# Patient Record
Sex: FEMALE | Race: WHITE | NOT HISPANIC OR LATINO | URBAN - NONMETROPOLITAN AREA
[De-identification: names, ages, dates, MRNs, and addresses within clinical notes are randomized per-mention and may not be internally consistent; named-entity substitution may affect disease eponyms.]

---

## 2017-04-27 ENCOUNTER — OFFICE VISIT (OUTPATIENT)
Dept: GASTROENTEROLOGY | Facility: CLINIC | Age: 47
End: 2017-04-27

## 2017-04-27 VITALS
TEMPERATURE: 98.3 F | SYSTOLIC BLOOD PRESSURE: 112 MMHG | HEIGHT: 70 IN | HEART RATE: 70 BPM | WEIGHT: 186.8 LBS | DIASTOLIC BLOOD PRESSURE: 84 MMHG | BODY MASS INDEX: 26.74 KG/M2 | OXYGEN SATURATION: 99 %

## 2017-04-27 DIAGNOSIS — K50.119 CROHN'S DISEASE OF LARGE INTESTINE WITH COMPLICATION (HCC): Primary | ICD-10-CM

## 2017-04-27 DIAGNOSIS — K50.10 CROHN'S DISEASE OF LARGE INTESTINE WITHOUT COMPLICATION (HCC): Primary | ICD-10-CM

## 2017-04-27 PROCEDURE — 99214 OFFICE O/P EST MOD 30 MIN: CPT | Performed by: INTERNAL MEDICINE

## 2017-04-27 RX ORDER — MESALAMINE 1.2 G/1
1200 TABLET, DELAYED RELEASE ORAL 4 TIMES DAILY
Qty: 360 TABLET | Refills: 3 | Status: SHIPPED | OUTPATIENT
Start: 2017-04-27 | End: 2017-04-27 | Stop reason: SDUPTHER

## 2017-04-27 RX ORDER — MESALAMINE 1.2 G/1
1200 TABLET, DELAYED RELEASE ORAL 4 TIMES DAILY
Qty: 360 TABLET | Refills: 3 | Status: SHIPPED | OUTPATIENT
Start: 2017-04-27 | End: 2018-02-23 | Stop reason: SDUPTHER

## 2017-04-27 RX ORDER — AZATHIOPRINE 50 MG/1
TABLET ORAL
COMMUNITY
Start: 2017-02-12 | End: 2017-05-25

## 2017-04-27 RX ORDER — TRIAMTERENE AND HYDROCHLOROTHIAZIDE 37.5; 25 MG/1; MG/1
CAPSULE ORAL
Refills: 5 | COMMUNITY
Start: 2017-04-09

## 2017-04-27 RX ORDER — MESALAMINE 1.2 G/1
TABLET, DELAYED RELEASE ORAL
COMMUNITY
Start: 2017-02-12 | End: 2017-04-27 | Stop reason: SDUPTHER

## 2017-04-27 NOTE — PROGRESS NOTES
Chief Complaint   Patient presents with   • Colonoscopy     Patient currently not having any problems.  No family history of colon cancer.     Subjective   HPI    Dulce Maria Bernal is a 46 y.o. female who presents with a history of Crohn's Disease.   Status of disease is quiet and stable.  The severity is described as Mild.  She denies  abdominal cramping, diarrhea, bloody stool and anorexia.  Experiences 1-2 BM daily.  Her only complaint is increased gas.  Has started to eat more yogurt.  Previous diagnostic test include  colonoscopy.  She is currently being treated with Imuran and Lialda.  Lab work from Dr Simms office in Oct 2016 was normal.  She reports she has gone 3 weeks without Imuran and has not experienced Crohn's related symptoms.    Past Medical History:   Diagnosis Date   • Crohn's disease    • Ulcerative colitis    • Ulcerative proctitis      Outpatient Prescriptions Marked as Taking for the 4/27/17 encounter (Office Visit) with Garry Faria, DO   Medication Sig Dispense Refill   • azaTHIOprine (IMURAN) 50 MG tablet      • LIALDA 1.2 G EC tablet      • triamterene-hydrochlorothiazide (DYAZIDE) 37.5-25 MG per capsule TAKE ONE CAPSULE BY MOUTH EVERY DAY  5     No Known Allergies  Social History     Social History   • Marital status:      Spouse name: N/A   • Number of children: N/A   • Years of education: N/A     Occupational History   • Not on file.     Social History Main Topics   • Smoking status: Never Smoker   • Smokeless tobacco: Never Used   • Alcohol use Yes      Comment: LITTLE BIT DAILY   • Drug use: No   • Sexual activity: Not on file     Other Topics Concern   • Not on file     Social History Narrative     Family History   Problem Relation Age of Onset   • Breast cancer Maternal Grandmother    • Crohn's disease Sister    • Colon cancer Neg Hx    • Colon polyps Neg Hx    • Esophageal cancer Neg Hx    • Liver cancer Neg Hx    • Liver disease Neg Hx    • Rectal cancer Neg Hx    •  Stomach cancer Neg Hx      Review of Systems   Constitutional: Negative for fatigue, fever and unexpected weight change.   HENT: Negative for hearing loss, sore throat and voice change.    Eyes: Negative for visual disturbance.   Respiratory: Negative for cough, shortness of breath and wheezing.    Cardiovascular: Negative for chest pain and palpitations.   Gastrointestinal: Negative for abdominal pain, blood in stool and vomiting.   Endocrine: Negative for polydipsia and polyuria.   Genitourinary: Negative for difficulty urinating, dysuria, hematuria and urgency.   Musculoskeletal: Negative for joint swelling and myalgias.   Skin: Negative for color change, rash and wound.   Neurological: Negative for dizziness, tremors, seizures and syncope.   Hematological: Does not bruise/bleed easily.   Psychiatric/Behavioral: Negative for agitation and confusion. The patient is not nervous/anxious.      Objective   Vitals:    04/27/17 1316   BP: 112/84   Pulse: 70   Temp: 98.3 °F (36.8 °C)   SpO2: 99%     Physical Exam   Constitutional: She is oriented to person, place, and time. She appears well-developed and well-nourished.   HENT:   Head: Normocephalic and atraumatic.   Eyes: Conjunctivae are normal. Pupils are equal, round, and reactive to light. No scleral icterus.   Neck: No JVD present. No thyroid mass and no thyromegaly present.   Cardiovascular: Normal rate, regular rhythm and normal heart sounds.  Exam reveals no gallop and no friction rub.    No murmur heard.  Pulmonary/Chest: Effort normal and breath sounds normal. No accessory muscle usage. No respiratory distress. She has no wheezes. She has no rales.   Abdominal: Soft. Bowel sounds are normal. She exhibits no distension, no ascites and no mass. There is no splenomegaly or hepatomegaly. There is no tenderness. There is no rebound and no guarding.   Genitourinary:   Genitourinary Comments: Rectal-Did not examine   Musculoskeletal: Normal range of motion. She  exhibits no edema.   Neurological: She is alert and oriented to person, place, and time.   Deemed a reliable historian, able to converse without difficulty and able to move all extremities without difficulty   Skin: Skin is warm and dry.   Psychiatric: She has a normal mood and affect. Her behavior is normal.     Imaging Results (most recent)     None        Assessment/Plan   Dulce Maria was seen today for colonoscopy.    Diagnoses and all orders for this visit:    Crohn's disease of large intestine with complication  -     Case Request; Standing  -     Case Request    Other orders  -     Sod Picosulfate-Mag Ox-Cit Acd (PREPOPIK) 10-3.5-12 MG-GM-GM pack; Take as directed  -     Implement Anesthesia Orders Day of Procedure; Standing  -     Obtain Informed Consent; Standing  -     Verify Informed Consent; Standing      COLONOSCOPY WITH ANESTHESIA (N/A)     Avoid gas producing foods  Information regarding Fodmap diet given to patient  Lengthy discussion regarding continuing Imuran v starting biologic therapy  For now will decrease Imuran to 50 mg to decrease risk of side effects  I will discuss possible treatment plan with Dr Rios

## 2017-04-27 NOTE — PATIENT INSTRUCTIONS
This Information Is About Your Diet    Low FODMAP Diet  FODMAPs are a type of carbohydrate / sugar found in foods. FODMAPs draw water into the intestinal tract, are poorly absorbed in the small intestine and are fermented by bacteria in the large intestine which produce gas. Distention in the large intestine from gas or water can trigger symptoms. Symptoms may include: Abdominal discomfort, gas, distention, cramps, bloating, fullness, diarrhea, constipation, and/or nausea. FODMAPs do not cause irritable bowel syndrome but managing FODMAPs in your diet may help with reducing your symptoms.    FODMAP is an acronym for:  Fermentable Oligosaccharides Disaccharides Monosaccharides And Polyols    • Fructans (wheat, rye, barley, garlic, vegetables)  • Galactins (beans, lentils)  • Fructose (fruits, fruit juice, honey, high fructose corn syrup)  • Lactose (dairy products)  • Polyols or “Sugar Alcohols” (Sorbitol, Mannitol, Xylitol, Malitol, Lactitol, Erthrytol, Isomalt, fruits)    General Guidelines  • Fresh or frozen fruit may be better tolerated than canned fruit.  • Limit concentrated sources of fruit - such as dried fruits or fruit juices.  • Avoid eating large amounts of any fruit.  • Cooked vegetables may be tolerated better.  • Tolerance may depend on the amount you eat at one time.  • Sweeteners such as sorbitol, mannitol, xylitol, malitol and isomalt are used in sugar-free gum, candies and mints.  • Many liquid medications and some personal care items may contain lactose or sugar alcohols (sorbitol, mannitol, xylitol, malitol, isomalt). Examples include: liquid pain relievers (including liquid gelcaps), cough medicines and cough drops.    This diet is recommended to be followed for 4-6 weeks and then begin to add small amounts of 1 to 2 foods back into diet at a time to figure out which foods can be eaten or need to be avoided completely.  If too many foods are added back to the diet at once, it may be difficult to  tell which foods cause problems.  Many people feel better the first week they stop eating these foods and will only end up needing to avoid large amounts of a few of the high FODMAP foods.    Examples of test foods from each category  • Lactose: ½ - 1 cup of milk  • Fructose: ½ yaniv or 102 tsp of honey  • Fructans: 2 slices of whole wheat bread, 1 garlic clove or 1 cup of pasta  • Galactans: ½ cup of lentils or chickpeas  Sugar Alcohols: 2-4 dried apricots or ½ cup of mushrooms          Foods to INCLUDE in a Low FODMAP diet  • Lactose - Lactose free dairy products, Milk (rice or almond), custard, Hard cheese (cheddar, swiss, brie, blue cheese, feta, mozzarella, parmesan), cream cheese, half & half and sherbert.  • Fruits (Fructose) - Limit to ½ cup serving; bananas, blueberries, oranges, strawberries, cantaloupe, kiwi, honeydew, grapefruit, lemon, lime, grapes, pineapple and rhubarb.  • Vegetables (Fructans) - Limit to 1-3 (1/2 cup) servings per meal; lettuce, spinach, carrots, yam, cucumber, green beans, eggplant, bok gibran, red bell pepper, tomatoes, potatoes, zucchini, winter or butternut squash, sweet potatoes, pumpkin, celery and corn.  • Grains (Fructans) - Gluten-free (breads, pasta, cereal and crackers) rice, corn, and oats.  • Meats, Poultry, Fish and Eggs - beef, pork, chicken, turkey, fish and eggs.  • Beans (Glactins) - Tofu, peanuts, peanut butter, almonds, pecans, walnuts, pumpkin seeds, sesame seeds, and sunflower seeds.  • Beverages - Decaffeinated tea  • Condiments/Sweeteners (Polyols) - Sugar, Equal, Splenda, most spices and herbs, mayonnaise, mustard, vinegar, olives, butter, margarine and oil.  • Alcohol - Limit alcohol to 1 serving per day because it is a gastric stimulant: beer, wine, vodka, and gin.    Foods to AVOID in a Low FODMAP diet  • Lactose - Milk (cow, sheep, goat and soy), evaporated milk, condensed milk, buttermilk, cottage cheese, ricotts cheese, sour cream, whipping cream, ice  cream, frozen yogurt and chocolate.  • Fruits (Fructose) - Concentrated fruit juices, dried fruit, apple, avocado, pears, cherries, raspberries, blackberries, watermelon, plums, apricots, peaches, nectarines, prunes, yaniv, papaya, coconut, canned fruit, large portions of fruit.  • Vegetables (Fructans) - Asparagus, artichokes, cabbage, sugar snap peas, onions, leeks, cauliflower, mushrooms, green bell pepper, broccoli, Brussel sprouts, garlic, okra, beets and summer squash.  • Grains (Fructans) - wheat, rye, barley, cereals with sorbitol and fructose.  • Meats, Poultry, Fish and Eggs - Soy products.  • Beans (Galactins) - Chickpeas, hummus, lentils, beans, black-eyed peas, soy products, pistachios and baked beans.  • Beverages - Fruit and vegetable juices, port wine, rosa, carbonated beverages, caffeinated coffee or tea.  • Condiments/Sweeteners (Polyols) - Honey, jam, jelly, molasses, high fructose corn syrup, nutmeg, pickles, relish, flavorings with sorbitol and fructose, sugar alcohols (sorbitol, mannitol, xylitol, malitol, isomalt, erythritol, and lactitol), hydrogenated starch hydrolysates.  • Alcohol - Rum

## 2017-05-25 ENCOUNTER — ANESTHESIA (OUTPATIENT)
Dept: GASTROENTEROLOGY | Facility: HOSPITAL | Age: 47
End: 2017-05-25

## 2017-05-25 ENCOUNTER — ANESTHESIA EVENT (OUTPATIENT)
Dept: GASTROENTEROLOGY | Facility: HOSPITAL | Age: 47
End: 2017-05-25

## 2017-05-25 ENCOUNTER — HOSPITAL ENCOUNTER (OUTPATIENT)
Facility: HOSPITAL | Age: 47
Setting detail: HOSPITAL OUTPATIENT SURGERY
Discharge: HOME | End: 2017-05-25
Attending: INTERNAL MEDICINE | Admitting: INTERNAL MEDICINE

## 2017-05-25 VITALS
TEMPERATURE: 97.7 F | DIASTOLIC BLOOD PRESSURE: 78 MMHG | BODY MASS INDEX: 26.34 KG/M2 | SYSTOLIC BLOOD PRESSURE: 128 MMHG | HEIGHT: 70 IN | OXYGEN SATURATION: 100 % | RESPIRATION RATE: 20 BRPM | WEIGHT: 184 LBS | HEART RATE: 51 BPM

## 2017-05-25 DIAGNOSIS — K51.20 ULCERATIVE PROCTITIS (HCC): ICD-10-CM

## 2017-05-25 LAB — B-HCG UR QL: NEGATIVE

## 2017-05-25 PROCEDURE — 45380 COLONOSCOPY AND BIOPSY: CPT | Performed by: INTERNAL MEDICINE

## 2017-05-25 PROCEDURE — 81025 URINE PREGNANCY TEST: CPT | Performed by: ANESTHESIOLOGY

## 2017-05-25 PROCEDURE — 88305 TISSUE EXAM BY PATHOLOGIST: CPT | Performed by: INTERNAL MEDICINE

## 2017-05-25 PROCEDURE — 25010000002 PROPOFOL 10 MG/ML EMULSION: Performed by: NURSE ANESTHETIST, CERTIFIED REGISTERED

## 2017-05-25 RX ORDER — AZATHIOPRINE 50 MG/1
50 TABLET ORAL 2 TIMES DAILY
Qty: 180 TABLET | Refills: 3 | Status: SHIPPED | OUTPATIENT
Start: 2017-05-25 | End: 2018-02-23 | Stop reason: SDUPTHER

## 2017-05-25 RX ORDER — LIDOCAINE HYDROCHLORIDE 10 MG/ML
0.5 INJECTION, SOLUTION INFILTRATION; PERINEURAL ONCE AS NEEDED
Status: DISCONTINUED | OUTPATIENT
Start: 2017-05-25 | End: 2017-05-25 | Stop reason: HOSPADM

## 2017-05-25 RX ORDER — SODIUM CHLORIDE 0.9 % (FLUSH) 0.9 %
1-10 SYRINGE (ML) INJECTION AS NEEDED
Status: DISCONTINUED | OUTPATIENT
Start: 2017-05-25 | End: 2017-05-25 | Stop reason: HOSPADM

## 2017-05-25 RX ORDER — SODIUM CHLORIDE 9 MG/ML
100 INJECTION, SOLUTION INTRAVENOUS CONTINUOUS
Status: CANCELLED | OUTPATIENT
Start: 2017-05-25

## 2017-05-25 RX ORDER — PROPOFOL 10 MG/ML
VIAL (ML) INTRAVENOUS AS NEEDED
Status: DISCONTINUED | OUTPATIENT
Start: 2017-05-25 | End: 2017-05-25 | Stop reason: SURG

## 2017-05-25 RX ORDER — SODIUM CHLORIDE 0.9 % (FLUSH) 0.9 %
1-10 SYRINGE (ML) INJECTION AS NEEDED
Status: CANCELLED | OUTPATIENT
Start: 2017-05-25

## 2017-05-25 RX ORDER — SODIUM CHLORIDE 9 MG/ML
100 INJECTION, SOLUTION INTRAVENOUS CONTINUOUS
Status: DISCONTINUED | OUTPATIENT
Start: 2017-05-25 | End: 2017-05-25 | Stop reason: HOSPADM

## 2017-05-25 RX ORDER — SODIUM CHLORIDE 9 MG/ML
500 INJECTION, SOLUTION INTRAVENOUS CONTINUOUS PRN
Status: DISCONTINUED | OUTPATIENT
Start: 2017-05-25 | End: 2017-05-25 | Stop reason: HOSPADM

## 2017-05-25 RX ORDER — SODIUM CHLORIDE 0.9 % (FLUSH) 0.9 %
3 SYRINGE (ML) INJECTION AS NEEDED
Status: DISCONTINUED | OUTPATIENT
Start: 2017-05-25 | End: 2017-05-25 | Stop reason: HOSPADM

## 2017-05-25 RX ADMIN — PROPOFOL 100 MG: 10 INJECTION, EMULSION INTRAVENOUS at 09:47

## 2017-05-25 RX ADMIN — SODIUM CHLORIDE 100 ML/HR: 9 INJECTION, SOLUTION INTRAVENOUS at 09:08

## 2017-05-26 ENCOUNTER — TELEPHONE (OUTPATIENT)
Dept: GASTROENTEROLOGY | Facility: CLINIC | Age: 47
End: 2017-05-26

## 2017-05-26 LAB
CYTO UR: NORMAL
LAB AP CASE REPORT: NORMAL
LAB AP CLINICAL INFORMATION: NORMAL
Lab: NORMAL
PATH REPORT.FINAL DX SPEC: NORMAL
PATH REPORT.GROSS SPEC: NORMAL

## 2018-01-30 ENCOUNTER — TRANSCRIBE ORDERS (OUTPATIENT)
Dept: ADMINISTRATIVE | Facility: HOSPITAL | Age: 48
End: 2018-01-30

## 2018-01-30 DIAGNOSIS — Z12.31 ENCOUNTER FOR SCREENING MAMMOGRAM FOR MALIGNANT NEOPLASM OF BREAST: Primary | ICD-10-CM

## 2018-02-02 ENCOUNTER — HOSPITAL ENCOUNTER (OUTPATIENT)
Dept: MAMMOGRAPHY | Facility: HOSPITAL | Age: 48
Discharge: HOME | End: 2018-02-02
Attending: OBSTETRICS & GYNECOLOGY | Admitting: OBSTETRICS & GYNECOLOGY

## 2018-02-02 DIAGNOSIS — Z12.31 ENCOUNTER FOR SCREENING MAMMOGRAM FOR MALIGNANT NEOPLASM OF BREAST: ICD-10-CM

## 2018-02-02 PROCEDURE — 77067 SCR MAMMO BI INCL CAD: CPT

## 2018-02-02 PROCEDURE — 77063 BREAST TOMOSYNTHESIS BI: CPT

## 2018-02-22 ENCOUNTER — TELEPHONE (OUTPATIENT)
Dept: GASTROENTEROLOGY | Facility: CLINIC | Age: 48
End: 2018-02-22

## 2018-02-22 NOTE — TELEPHONE ENCOUNTER
Patient called and stated her insurance has changed.she needs her imuran and lialda sent into [facility]

## 2018-02-23 DIAGNOSIS — K50.10 CROHN'S DISEASE OF LARGE INTESTINE WITHOUT COMPLICATION (HCC): ICD-10-CM

## 2018-02-23 RX ORDER — AZATHIOPRINE 50 MG/1
50 TABLET ORAL 2 TIMES DAILY
Qty: 180 TABLET | Refills: 2 | Status: SHIPPED | OUTPATIENT
Start: 2018-02-23 | End: 2018-05-24

## 2018-02-23 RX ORDER — MESALAMINE 1.2 G/1
1200 TABLET, DELAYED RELEASE ORAL 4 TIMES DAILY
Qty: 360 TABLET | Refills: 2 | Status: SHIPPED | OUTPATIENT
Start: 2018-02-23 | End: 2018-11-05

## 2018-10-15 RX ORDER — AZATHIOPRINE 50 MG/1
TABLET ORAL
Qty: 180 TABLET | Refills: 2 | OUTPATIENT
Start: 2018-10-15

## 2018-11-05 ENCOUNTER — OFFICE VISIT (OUTPATIENT)
Dept: GASTROENTEROLOGY | Facility: CLINIC | Age: 48
End: 2018-11-05

## 2018-11-05 VITALS
DIASTOLIC BLOOD PRESSURE: 100 MMHG | BODY MASS INDEX: 27.57 KG/M2 | WEIGHT: 192.6 LBS | HEIGHT: 70 IN | SYSTOLIC BLOOD PRESSURE: 140 MMHG | HEART RATE: 66 BPM | TEMPERATURE: 97.8 F | OXYGEN SATURATION: 100 %

## 2018-11-05 DIAGNOSIS — K50.90 CROHN'S DISEASE WITHOUT COMPLICATION, UNSPECIFIED GASTROINTESTINAL TRACT LOCATION (HCC): Primary | ICD-10-CM

## 2018-11-05 PROCEDURE — 99214 OFFICE O/P EST MOD 30 MIN: CPT | Performed by: INTERNAL MEDICINE

## 2018-11-05 RX ORDER — AZATHIOPRINE 100 MG/1
100 TABLET ORAL DAILY
COMMUNITY
End: 2018-11-05 | Stop reason: SDUPTHER

## 2018-11-05 RX ORDER — MESALAMINE 1.2 G/1
1200 TABLET, DELAYED RELEASE ORAL DAILY
Qty: 30 TABLET | Refills: 11 | Status: SHIPPED | OUTPATIENT
Start: 2018-11-05 | End: 2018-12-05

## 2018-11-05 RX ORDER — NORETHINDRONE ACETATE AND ETHINYL ESTRADIOL, AND FERROUS FUMARATE 1MG-20(24)
1 KIT ORAL DAILY
COMMUNITY
Start: 2018-10-29

## 2018-11-05 RX ORDER — AZATHIOPRINE 100 MG/1
100 TABLET ORAL DAILY
Qty: 30 TABLET | Refills: 11 | Status: SHIPPED | OUTPATIENT
Start: 2018-11-05 | End: 2019-01-14 | Stop reason: SDUPTHER

## 2018-11-05 NOTE — PROGRESS NOTES
Chief Complaint   Patient presents with   • Crohn's Disease     Medication refill.     Subjective   HPI    Dulce Maria Bernal is a 48 y.o. female who presents with a history of Crohn's Disease.   Status of disease is quiet and stable.  The severity is described as Mild.  She denies  abdominal cramping, diarrhea, bloody stool and anorexia.  Previous diagnostic test include  colonoscopy.  Takes Lialda and Imuran with control of Crohn's related sx.  Dr Calvert follows lab work anticipated apt Nov 30.  She has prevously followed by Dr Foreman for IBD, he started Imuran/Lialda.    Past Medical History:   Diagnosis Date   • Crohn's disease (CMS/HCC)    • Ulcerative colitis (CMS/HCC)    • Ulcerative proctitis (CMS/HCC)      Outpatient Prescriptions Marked as Taking for the 11/5/18 encounter (Office Visit) with Garry Faria, DO   Medication Sig Dispense Refill   • azaTHIOprine (IMURAN) 100 MG tablet Take 1 tablet by mouth Daily. 30 tablet 11   • TAYTULLA 1-20 MG-MCG(24) capsule 1 tablet Daily.     • triamterene-hydrochlorothiazide (DYAZIDE) 37.5-25 MG per capsule TAKE ONE CAPSULE BY MOUTH EVERY DAY  5   • [DISCONTINUED] azaTHIOprine (IMURAN) 100 MG tablet Take 100 mg by mouth Daily.     • [DISCONTINUED] LIALDA 1.2 g EC tablet Take 1 tablet by mouth 4 (Four) Times a Day. 360 tablet 2     No Known Allergies  Social History     Social History   • Marital status:      Spouse name: N/A   • Number of children: N/A   • Years of education: N/A     Occupational History   • Not on file.     Social History Main Topics   • Smoking status: Never Smoker   • Smokeless tobacco: Never Used   • Alcohol use Yes      Comment: LITTLE BIT DAILY   • Drug use: No   • Sexual activity: Defer     Other Topics Concern   • Not on file     Social History Narrative   • No narrative on file     Family History   Problem Relation Age of Onset   • Breast cancer Maternal Grandmother    • Crohn's disease Sister    • Colon cancer Neg Hx    • Colon  polyps Neg Hx    • Esophageal cancer Neg Hx    • Liver cancer Neg Hx    • Liver disease Neg Hx    • Rectal cancer Neg Hx    • Stomach cancer Neg Hx      Review of Systems   Constitutional: Negative for fatigue, fever and unexpected weight change.   HENT: Negative for hearing loss, sore throat and voice change.    Eyes: Negative for visual disturbance.   Respiratory: Negative for cough, shortness of breath and wheezing.    Cardiovascular: Negative for chest pain and palpitations.   Gastrointestinal: Negative for abdominal pain, blood in stool and vomiting.   Endocrine: Negative for polydipsia and polyuria.   Genitourinary: Negative for difficulty urinating, dysuria, hematuria and urgency.   Musculoskeletal: Negative for joint swelling and myalgias.   Skin: Negative for color change, rash and wound.   Neurological: Negative for dizziness, tremors, seizures and syncope.   Hematological: Does not bruise/bleed easily.   Psychiatric/Behavioral: Negative for agitation and confusion. The patient is not nervous/anxious.      Objective   Vitals:    11/05/18 1455   BP: 140/100   Pulse: 66   Temp: 97.8 °F (36.6 °C)   SpO2: 100%     Physical Exam   Constitutional: She is oriented to person, place, and time. She appears well-developed and well-nourished. She is cooperative.   HENT:   Head: Normocephalic and atraumatic.   Eyes: Pupils are equal, round, and reactive to light. Conjunctivae are normal. No scleral icterus.   Neck: Normal range of motion. Neck supple. No JVD present. No thyroid mass and no thyromegaly present.   Cardiovascular: Normal rate, regular rhythm and normal heart sounds.  Exam reveals no gallop and no friction rub.    No murmur heard.  Pulmonary/Chest: Effort normal and breath sounds normal. No accessory muscle usage. No respiratory distress. She has no wheezes. She has no rales.   Abdominal: Soft. Normal appearance and bowel sounds are normal. She exhibits no distension, no ascites and no mass. There is no  hepatosplenomegaly. There is no tenderness. There is no rebound and no guarding.   Musculoskeletal: Normal range of motion. She exhibits no edema or tenderness.     Vascular Status -  Her right foot exhibits normal foot vasculature  and no edema. Her left foot exhibits normal foot vasculature  and no edema.  Lymphadenopathy:     She has no cervical adenopathy.   Neurological: She is alert and oriented to person, place, and time. She has normal strength. Gait normal.   Skin: Skin is warm, dry and intact. No rash noted.     Imaging Results (most recent)     None        Body mass index is 27.64 kg/m².  Assessment/Plan   Dulce Maria was seen today for crohn's disease.    Diagnoses and all orders for this visit:    Crohn's disease without complication, unspecified gastrointestinal tract location (CMS/HCC)  -     mesalamine (LIALDA) 1.2 g EC tablet; Take 1 tablet by mouth Daily for 30 days.  -     azaTHIOprine (IMURAN) 100 MG tablet; Take 1 tablet by mouth Daily.      * Surgery not found *    Request Dr Calvert draw CBC  Discussion regarding prolong use of Imuran and transitioning to biologic therapy  Decrease lialda from 4 to 1 pills x 6 months then stop   Pt not having problems wishes to proceed with Imuran  Suggested eval by IBD  cscope due 2020    Patient's Body mass index is 27.64 kg/m². BMI is above normal parameters. Recommendations include: no follow-up required.

## 2019-01-07 ENCOUNTER — TELEPHONE (OUTPATIENT)
Dept: GASTROENTEROLOGY | Facility: CLINIC | Age: 49
End: 2019-01-07

## 2019-01-07 NOTE — TELEPHONE ENCOUNTER
Treated with Imuran for Crohn's Disease    Labs from PCP 1/4/19    Hgb 14.0  Plt 292  WBC 5.8    CMP unremarkable   BUN 17  Cr 0.9  ALT 13  AST 16

## 2019-01-11 ENCOUNTER — TELEPHONE (OUTPATIENT)
Dept: GASTROENTEROLOGY | Facility: CLINIC | Age: 49
End: 2019-01-11

## 2019-01-11 NOTE — TELEPHONE ENCOUNTER
Patient called and needs her imuran and mesalamine (lialda)sent into [facility] 3 month refills.

## 2019-01-14 DIAGNOSIS — K50.90 CROHN'S DISEASE WITHOUT COMPLICATION, UNSPECIFIED GASTROINTESTINAL TRACT LOCATION (HCC): ICD-10-CM

## 2019-01-14 RX ORDER — AZATHIOPRINE 100 MG/1
100 TABLET ORAL DAILY
Qty: 90 TABLET | Refills: 3 | Status: SHIPPED | OUTPATIENT
Start: 2019-01-14 | End: 2019-01-14 | Stop reason: SDUPTHER

## 2019-01-14 RX ORDER — MESALAMINE 1.2 G/1
1200 TABLET, DELAYED RELEASE ORAL 4 TIMES DAILY
Qty: 360 TABLET | Refills: 0 | Status: SHIPPED | OUTPATIENT
Start: 2019-01-14 | End: 2019-02-13

## 2019-01-14 RX ORDER — AZATHIOPRINE 100 MG/1
100 TABLET ORAL DAILY
Qty: 90 TABLET | Refills: 3 | Status: SHIPPED | OUTPATIENT
Start: 2019-01-14 | End: 2019-01-15

## 2019-01-14 RX ORDER — MESALAMINE 1.2 G/1
1200 TABLET, DELAYED RELEASE ORAL 4 TIMES DAILY
Qty: 360 TABLET | Refills: 0 | Status: SHIPPED | OUTPATIENT
Start: 2019-01-14 | End: 2019-01-14 | Stop reason: SDUPTHER

## 2019-01-14 NOTE — TELEPHONE ENCOUNTER
Imuran/lialda sent to [facility]  Message left to [facility] prescriptions    Imuran/lialda resent to [facility]

## 2019-01-14 NOTE — TELEPHONE ENCOUNTER
Imuran and lialda sent to pharmacy    No refills sent in on Lialda as she should be weaning off per Nov office note

## 2019-01-15 RX ORDER — AZATHIOPRINE 50 MG/1
100 TABLET ORAL DAILY
Qty: 180 TABLET | Refills: 3 | Status: SHIPPED | OUTPATIENT
Start: 2019-01-15 | End: 2019-02-14

## 2019-03-05 ENCOUNTER — TRANSCRIBE ORDERS (OUTPATIENT)
Dept: LABOR AND DELIVERY | Facility: HOSPITAL | Age: 49
End: 2019-03-05

## 2019-03-05 DIAGNOSIS — Z12.39 SCREENING BREAST EXAMINATION: Primary | ICD-10-CM

## 2019-03-08 ENCOUNTER — HOSPITAL ENCOUNTER (OUTPATIENT)
Dept: MAMMOGRAPHY | Facility: HOSPITAL | Age: 49
Discharge: HOME | End: 2019-03-08
Admitting: OBSTETRICS & GYNECOLOGY

## 2019-03-08 PROCEDURE — 77067 SCR MAMMO BI INCL CAD: CPT

## 2019-03-08 PROCEDURE — 77063 BREAST TOMOSYNTHESIS BI: CPT

## 2019-09-25 ENCOUNTER — OFFICE VISIT (OUTPATIENT)
Dept: GASTROENTEROLOGY | Facility: CLINIC | Age: 49
End: 2019-09-25

## 2019-09-25 VITALS
BODY MASS INDEX: 22.9 KG/M2 | WEIGHT: 160 LBS | HEART RATE: 54 BPM | SYSTOLIC BLOOD PRESSURE: 134 MMHG | DIASTOLIC BLOOD PRESSURE: 80 MMHG | OXYGEN SATURATION: 99 % | HEIGHT: 70 IN | TEMPERATURE: 97 F

## 2019-09-25 DIAGNOSIS — K50.919 CROHN'S DISEASE WITH COMPLICATION, UNSPECIFIED GASTROINTESTINAL TRACT LOCATION (HCC): Primary | ICD-10-CM

## 2019-09-25 PROCEDURE — 99213 OFFICE O/P EST LOW 20 MIN: CPT | Performed by: INTERNAL MEDICINE

## 2019-09-25 RX ORDER — MESALAMINE 1.2 G/1
1200 TABLET, DELAYED RELEASE ORAL DAILY
Qty: 90 TABLET | Refills: 3 | Status: SHIPPED | OUTPATIENT
Start: 2019-09-25 | End: 2019-10-25

## 2019-09-25 RX ORDER — AZATHIOPRINE 50 MG/1
100 TABLET ORAL DAILY
Qty: 180 TABLET | Refills: 3 | Status: SHIPPED | OUTPATIENT
Start: 2019-09-25 | End: 2019-10-25

## 2019-09-25 RX ORDER — MESALAMINE 1.2 G/1
4800 TABLET, DELAYED RELEASE ORAL
COMMUNITY
End: 2019-09-25 | Stop reason: DRUGHIGH

## 2019-09-25 RX ORDER — AZATHIOPRINE 50 MG/1
TABLET ORAL
COMMUNITY
Start: 2019-06-26 | End: 2019-09-25

## 2019-09-25 NOTE — PROGRESS NOTES
Chief Complaint   Patient presents with   • Crohn's Disease     neds meds refill lialda and imuran [facility] 3 month refills       PCP: Junior Calvert MD  REFER: No ref. provider found      Subjective   HPI    Dulce Maria Bernal is a 49 y.o. female who presents with a history of Crohn's Disease.   Status of disease is quiet and stable.  The severity is described as Mild.  She denies  abdominal cramping, diarrhea, bloody stool and anorexia.  Previous diagnostic test include  colonoscopy.    Past Medical History:   Diagnosis Date   • Crohn's disease (CMS/[facility])    • Ulcerative colitis (CMS/[facility])    • Ulcerative proctitis (CMS/[facility])      Outpatient Medications Marked as Taking for the 9/25/19 encounter (Office Visit) with Garry Faria, DO   Medication Sig Dispense Refill   • TAYTULLA 1-20 MG-MCG(24) capsule 1 tablet Daily.     • triamterene-hydrochlorothiazide (DYAZIDE) 37.5-25 MG per capsule TAKE ONE CAPSULE BY MOUTH EVERY DAY  5   • [DISCONTINUED] azaTHIOprine (IMURAN) 50 MG tablet      • [DISCONTINUED] mesalamine (LIALDA) 1.2 g EC tablet Take 4,800 mg by mouth.       No Known Allergies  Social History     Socioeconomic History   • Marital status:      Spouse name: Not on file   • Number of children: Not on file   • Years of education: Not on file   • Highest education level: Not on file   Tobacco Use   • Smoking status: Never Smoker   • Smokeless tobacco: Never Used   Substance and Sexual Activity   • Alcohol use: Yes     Comment: LITTLE BIT DAILY   • Drug use: No   • Sexual activity: Defer     Family History   Problem Relation Age of Onset   • Breast cancer Maternal Grandmother    • Crohn's disease Sister    • No Known Problems Mother    • No Known Problems Father    • No Known Problems Brother    • No Known Problems Daughter    • No Known Problems Son    • No Known Problems Paternal Grandmother    • No Known Problems Maternal Aunt    • No Known Problems Paternal Aunt    • Colon cancer Neg Hx    • Colon  polyps Neg Hx    • Esophageal cancer Neg Hx    • Liver cancer Neg Hx    • Liver disease Neg Hx    • Rectal cancer Neg Hx    • Stomach cancer Neg Hx    • BRCA 1/2 Neg Hx    • Endometrial cancer Neg Hx    • Ovarian cancer Neg Hx      Review of Systems   Constitutional: Negative for fatigue, fever and unexpected weight change.   HENT: Negative for hearing loss, sore throat and voice change.    Eyes: Negative for visual disturbance.   Respiratory: Negative for cough, shortness of breath and wheezing.    Cardiovascular: Negative for chest pain and palpitations.   Gastrointestinal: Negative for abdominal pain, blood in stool and vomiting.   Endocrine: Negative for polydipsia and polyuria.   Genitourinary: Negative for difficulty urinating, dysuria, hematuria and urgency.   Musculoskeletal: Negative for joint swelling and myalgias.   Skin: Negative for color change, rash and wound.   Neurological: Negative for dizziness, tremors, seizures and syncope.   Hematological: Does not bruise/bleed easily.   Psychiatric/Behavioral: Negative for agitation and confusion. The patient is not nervous/anxious.      Objective   Vitals:    09/25/19 1510   BP: 134/80   Pulse: 54   Temp: 97 °F (36.1 °C)   SpO2: 99%     Physical Exam   Constitutional: She is oriented to person, place, and time. She appears well-developed and well-nourished. She is cooperative.   HENT:   Head: Normocephalic and atraumatic.   Eyes: Conjunctivae are normal. Pupils are equal, round, and reactive to light. No scleral icterus.   Neck: Normal range of motion. Neck supple. No JVD present. No thyroid mass and no thyromegaly present.   Cardiovascular: Normal rate, regular rhythm and normal heart sounds. Exam reveals no gallop and no friction rub.   No murmur heard.  Pulmonary/Chest: Effort normal and breath sounds normal. No accessory muscle usage. No respiratory distress. She has no wheezes. She has no rales.   Abdominal: Soft. Normal appearance and bowel sounds are  normal. She exhibits no distension, no ascites and no mass. There is no hepatosplenomegaly. There is no tenderness. There is no rebound and no guarding.   Musculoskeletal: Normal range of motion. She exhibits no edema or tenderness.     Vascular Status -  Her right foot exhibits normal foot vasculature  and no edema. Her left foot exhibits normal foot vasculature  and no edema.  Lymphadenopathy:     She has no cervical adenopathy.   Neurological: She is alert and oriented to person, place, and time. She has normal strength. Gait normal.   Skin: Skin is warm, dry and intact. No rash noted.     Imaging Results (most recent)     None        Body mass index is 22.96 kg/m².  Assessment/Plan   Dulce Maria was seen today for crohn's disease.    Diagnoses and all orders for this visit:    Crohn's disease with complication, unspecified gastrointestinal tract location (CMS/HCC)    Other orders  -     azaTHIOprine (IMURAN) 50 MG tablet; Take 2 tablets by mouth Daily for 30 days.  -     mesalamine (LIALDA) 1.2 g EC tablet; Take 1 tablet by mouth Daily for 30 days.      * Surgery not found *    Colonoscopy due May 2020  Encouraged consideration for biologic therapy and stopping imuran therapy  Discussed risk of cancer with use of Imuran  Discussed referral to [facility]  She will consider medication adjustments

## 2019-12-30 RX ORDER — AZATHIOPRINE 50 MG/1
TABLET ORAL
Qty: 180 TABLET | Refills: 3 | OUTPATIENT
Start: 2019-12-30

## 2020-01-02 ENCOUNTER — TELEPHONE (OUTPATIENT)
Dept: GASTROENTEROLOGY | Facility: CLINIC | Age: 50
End: 2020-01-02

## 2020-01-02 NOTE — TELEPHONE ENCOUNTER
Called into [facility] [phone]. Azathioprine 50mg 2 daily #180 0 REFILLS.

## 2020-01-24 ENCOUNTER — TELEPHONE (OUTPATIENT)
Dept: GASTROENTEROLOGY | Facility: CLINIC | Age: 50
End: 2020-01-24

## 2020-02-03 ENCOUNTER — OFFICE VISIT (OUTPATIENT)
Dept: OBGYN | Age: 50
End: 2020-02-03
Payer: COMMERCIAL

## 2020-02-03 VITALS
HEART RATE: 68 BPM | HEIGHT: 70 IN | BODY MASS INDEX: 23.48 KG/M2 | SYSTOLIC BLOOD PRESSURE: 122 MMHG | DIASTOLIC BLOOD PRESSURE: 89 MMHG | WEIGHT: 164 LBS

## 2020-02-03 PROCEDURE — 99386 PREV VISIT NEW AGE 40-64: CPT | Performed by: NURSE PRACTITIONER

## 2020-02-03 RX ORDER — LORAZEPAM 1 MG/1
1 TABLET ORAL EVERY 6 HOURS PRN
COMMUNITY

## 2020-02-03 RX ORDER — TRIAMTERENE AND HYDROCHLOROTHIAZIDE 37.5; 25 MG/1; MG/1
1 CAPSULE ORAL EVERY MORNING
COMMUNITY

## 2020-02-03 RX ORDER — NORETHINDRONE ACETATE AND ETHINYL ESTRADIOL, AND FERROUS FUMARATE 1MG-20(24)
1 KIT ORAL DAILY
Qty: 84 CAPSULE | Refills: 3 | Status: SHIPPED | OUTPATIENT
Start: 2020-02-03 | End: 2021-01-11

## 2020-02-03 ASSESSMENT — ENCOUNTER SYMPTOMS
EYES NEGATIVE: 1
CONSTIPATION: 0
RESPIRATORY NEGATIVE: 1
ALLERGIC/IMMUNOLOGIC NEGATIVE: 1
GASTROINTESTINAL NEGATIVE: 1
DIARRHEA: 0

## 2020-02-03 NOTE — PROGRESS NOTES
Pt presents today to establish care and for pap smear and breast exam. She had been taking Tatula using for hormones and would like this back [employer] would not pay for it.      EODRH:[id number]  Pap smear:2019  Contraception: wants to discuss     P:2  Ab:2  Bone density:NA  Colonoscopy:2017

## 2020-02-03 NOTE — PROGRESS NOTES
tablet Take 10 mg by mouth 3 times daily as needed for Muscle spasms      mesalamine (LIALDA) 1.2 G EC tablet Take 4,800 mg by mouth daily      azaTHIOprine 100 MG TABS Take 100 mg by mouth daily       No current facility-administered medications for this visit. No Known Allergies  Vitals:    02/03/20 1351   BP: 122/89   Pulse: 68     Body mass index is 23.53 kg/m². Review of Systems   Constitutional: Negative. HENT: Negative. Eyes: Negative. Respiratory: Negative. Cardiovascular: Negative. Gastrointestinal: Negative. Negative for constipation and diarrhea. Endocrine: Negative. Genitourinary: Positive for menstrual problem (irregular). Negative for frequency and urgency. Musculoskeletal: Negative. Skin: Negative. Allergic/Immunologic: Negative. Neurological: Negative. Hematological: Negative. Psychiatric/Behavioral: Negative. All other systems reviewed and are negative. Physical Exam  Vitals signs and nursing note reviewed. Constitutional:       Appearance: She is well-developed. HENT:      Head: Normocephalic. Neck:      Musculoskeletal: Normal range of motion and neck supple. Thyroid: No thyroid mass or thyromegaly. Cardiovascular:      Rate and Rhythm: Normal rate and regular rhythm. Pulmonary:      Effort: Pulmonary effort is normal.      Breath sounds: Normal breath sounds. Chest:      Breasts:         Right: No inverted nipple, mass, nipple discharge or skin change. Left: No inverted nipple, mass, nipple discharge or skin change. Comments: Bilateral implants intact  Abdominal:      Palpations: Abdomen is soft. There is no mass. Tenderness: There is no abdominal tenderness. Genitourinary:     General: Normal vulva. Vagina: Normal.      Cervix: Cervical bleeding (menstrual blood) present. No cervical motion tenderness. Uterus: Normal. Not enlarged. Adnexa:         Right: No mass or tenderness. Left: No mass or tenderness. Comments: Pap collected  Menstrual blood noted on pap brush  Musculoskeletal: Normal range of motion. Skin:     General: Skin is warm and dry. Neurological:      Mental Status: She is alert and oriented to person, place, and time. Psychiatric:         Attention and Perception: Attention normal.         Mood and Affect: Mood normal.         Speech: Speech normal.         Behavior: Behavior normal.         Thought Content: Thought content normal.         Cognition and Memory: Cognition normal.         Judgment: Judgment normal.          Diagnosis Orders   1. Women's annual routine gynecological examination     2. Screening for cervical cancer  PAP SMEAR    Human papillomavirus (HPV) DNA probe thin prep high risk   3. Screening for breast cancer  JUAN DAVID DIGITAL SCREEN W CAD BILATERAL       MEDICATIONS:  Orders Placed This Encounter   Medications    Norethin Ace-Eth Estrad-FE (TAYTULLA) 1-20 MG-MCG(24) CAPS     Sig: Take 1 capsule by mouth daily     Dispense:  84 capsule     Refill:  3       ORDERS:  Orders Placed This Encounter   Procedures    JUAN DAVID DIGITAL SCREEN W CAD BILATERAL    PAP SMEAR    Human papillomavirus (HPV) DNA probe thin prep high risk       PLAN:  Pap collected  Gave order for screening mammogram  Gave coupon card and refills of Taytulla, if ins does not accept, then can start [address]    Patient Instructions   Patient Education        Breast Self-Exam: Care Instructions  Your Care Instructions    A breast self-exam is when you check your breasts for lumps or changes. This regular exam helps you learn how your breasts normally look and feel. Most breast problems or changes are not because of cancer. Breast self-exam is not a substitute for a mammogram. Having regular breast exams by your doctor and regular mammograms improve your chances of finding any problems with your breasts.   Some women set a time each month to do a step-by-step breast self-exam. Other women like a less formal system. They might look at their breasts as they brush their teeth, or feel their breasts once in a while in the shower. If you notice a change in your breast, tell your doctor. Follow-up care is a key part of your treatment and safety. Be sure to make and go to all appointments, and call your doctor if you are having problems. It's also a good idea to know your test results and keep a list of the medicines you take. How do you do a breast self-exam?  · The best time to examine your breasts is usually one week after your menstrual period begins. Your breasts should not be tender then. If you do not have periods, you might do your exam on a day of the month that is easy to remember. · To examine your breasts:  ? Remove all your clothes above the waist and lie down. When you are lying down, your breast tissue spreads evenly over your chest wall, which makes it easier to feel all your breast tissue. ? Use the pads--not the fingertips--of the 3 middle fingers of your left hand to check your right breast. Move your fingers slowly in small coin-sized circles that overlap. ? Use three levels of pressure to feel of all your breast tissue. Use light pressure to feel the tissue close to the skin surface. Use medium pressure to feel a little deeper. Use firm pressure to feel your tissue close to your breastbone and ribs. Use each pressure level to feel your breast tissue before moving on to the next spot. ? Check your entire breast, moving up and down as if following a strip from the collarbone to the bra line, and from the armpit to the ribs. Repeat until you have covered the entire breast.  ? Repeat this procedure for your left breast, using the pads of the 3 middle fingers of your right hand. · To examine your breasts while in the shower:  ? Place one arm over your head and lightly soap your breast on that side. ?  Using the pads of your fingers, gently move your hand over your breast (in the smoke or allow others to smoke around you. If you need help quitting, talk to your doctor about stop-smoking programs and medicines. These can increase your chances of quitting for good. · Talk to your doctor about whether you have any risk factors for sexually transmitted infections (STIs). Having one sex partner (who does not have STIs and does not have sex with anyone else) is a good way to avoid these infections. · Use birth control if you do not want to have children at this time. Talk with your doctor about the choices available and what might be best for you. · Protect your skin from too much sun. When you're outdoors from 10 a.m. to 4 p.m., stay in the shade or cover up with clothing and a hat with a wide brim. Wear sunglasses that block UV rays. Even when it's cloudy, put broad-spectrum sunscreen (SPF 30 or higher) on any exposed skin. · See a dentist one or two times a year for checkups and to have your teeth cleaned. · Wear a seat belt in the car. Follow your doctor's advice about when to have certain tests. These tests can spot problems early. For everyone  · Cholesterol. Have the fat (cholesterol) in your blood tested after age 21. Your doctor will tell you how often to have this done based on your age, family history, or other things that can increase your risk for heart disease. · Blood pressure. Have your blood pressure checked during a routine doctor visit. Your doctor will tell you how often to check your blood pressure based on your age, your blood pressure results, and other factors. · Vision. Talk with your doctor about how often to have a glaucoma test.  · Diabetes. Ask your doctor whether you should have tests for diabetes. · Colon cancer. Your risk for colorectal cancer gets higher as you get older. Some experts say that adults should start regular screening at age 48 and stop at age 76. Others say to start before age 48 or continue after age 76.  Talk with your doctor about your risk and when to start and stop screening. For women  · Breast exam and mammogram. Talk to your doctor about when you should have a clinical breast exam and a mammogram. Medical experts differ on whether and how often women under 50 should have these tests. Your doctor can help you decide what is right for you. · Cervical cancer screening test and pelvic exam. Begin with a Pap test at age 24. The test often is part of a pelvic exam. Starting at age 27, you may choose to have a Pap test, an HPV test, or both tests at the same time (called co-testing). Talk with your doctor about how often to have testing. · Tests for sexually transmitted infections (STIs). Ask whether you should have tests for STIs. You may be at risk if you have sex with more than one person, especially if your partners do not wear condoms. For men  · Tests for sexually transmitted infections (STIs). Ask whether you should have tests for STIs. You may be at risk if you have sex with more than one person, especially if you do not wear a condom. · Testicular cancer exam. Ask your doctor whether you should check your testicles regularly. · Prostate exam. Talk to your doctor about whether you should have a blood test (called a PSA test) for prostate cancer. Experts differ on whether and when men should have this test. Some experts suggest it if you are older than 39 and are -American or have a father or brother who got prostate cancer when he was younger than 72. When should you call for help? Watch closely for changes in your health, and be sure to contact your doctor if you have any problems or symptoms that concern you. Where can you learn more? Go to [email or web address]://vinod.[email or web address] [email or web address] org and sign in to your SaveMeeting account. Enter P072 in the [employer] box to learn more about \"Well Visit, Ages 25 to 48: Care Instructions. \"     If you do not have an account, please click on the \"Sign Up Now\" link.   Current as of: August 21, 2019  Content Version: 12.3  © 0638-5931 Healthwise, Incorporated. Care instructions adapted under license by [facility] ([facility]). If you have questions about a medical condition or this instruction, always ask your healthcare professional. [address] any warranty or liability for your use of this information.

## 2020-02-03 NOTE — PATIENT INSTRUCTIONS
have a father or brother who got prostate cancer when he was younger than 72. When should you call for help? Watch closely for changes in your health, and be sure to contact your doctor if you have any problems or symptoms that concern you. Where can you learn more? Go to https://chpechuyitaeb.[email or web address] org and sign in to your [employer] account. Enter P072 in the [employer] box to learn more about \"Well Visit, Ages 25 to 48: Care Instructions. \"     If you do not have an account, please click on the \"Sign Up Now\" link. Current as of: August 21, 2019  Content Version: 12.3  © 6814-3105 Healthwise, Incorporated. Care instructions adapted under license by [facility] ([facility]). If you have questions about a medical condition or this instruction, always ask your healthcare professional. [address] any warranty or liability for your use of this information.

## 2020-02-04 ENCOUNTER — TRANSCRIBE ORDERS (OUTPATIENT)
Dept: ADMINISTRATIVE | Facility: HOSPITAL | Age: 50
End: 2020-02-04

## 2020-02-04 DIAGNOSIS — Z12.31 OTHER SCREENING MAMMOGRAM: Primary | ICD-10-CM

## 2020-02-06 LAB
HPV COMMENT: NORMAL
HPV TYPE 16: NOT DETECTED
HPV TYPE 18: NOT DETECTED
HPVOH (OTHER TYPES): NOT DETECTED

## 2020-03-10 ENCOUNTER — TELEPHONE (OUTPATIENT)
Dept: GASTROENTEROLOGY | Facility: CLINIC | Age: 50
End: 2020-03-10

## 2020-03-10 NOTE — TELEPHONE ENCOUNTER
LAST OFFICE VISIT : 09/25/2019  COLON RECALL MAY 2020    Patient states no NEW GI, HEART, or LUNG problems at this time.     We verified insurance, meds, and past medical history.    Patient verbally understood instructions and stated they would have a  with them the day of procedure.    Pharmacy : [facility]    I will mail a copy of instructions and instructed patient to contact me if they have any questions.     Please put in case request.     Thanks.     06/05/2020 AT 7AM

## 2020-03-12 ENCOUNTER — PREP FOR SURGERY (OUTPATIENT)
Dept: OTHER | Facility: HOSPITAL | Age: 50
End: 2020-03-12

## 2020-03-12 DIAGNOSIS — K50.90 CROHN'S DISEASE WITHOUT COMPLICATION, UNSPECIFIED GASTROINTESTINAL TRACT LOCATION (HCC): Primary | ICD-10-CM

## 2020-03-12 RX ORDER — SODIUM PICOSULFATE, MAGNESIUM OXIDE, AND ANHYDROUS CITRIC ACID 10; 3.5; 12 MG/160ML; G/160ML; G/160ML
1 LIQUID ORAL TAKE AS DIRECTED
Qty: 320 ML | Refills: 0 | Status: SHIPPED | OUTPATIENT
Start: 2020-03-12

## 2020-03-13 ENCOUNTER — HOSPITAL ENCOUNTER (OUTPATIENT)
Dept: MAMMOGRAPHY | Facility: HOSPITAL | Age: 50
Discharge: HOME | End: 2020-03-13
Admitting: NURSE PRACTITIONER

## 2020-03-13 DIAGNOSIS — Z12.31 OTHER SCREENING MAMMOGRAM: ICD-10-CM

## 2020-03-13 PROBLEM — K50.90 CROHN'S DISEASE WITHOUT COMPLICATION (HCC): Status: ACTIVE | Noted: 2020-03-13

## 2020-03-13 PROCEDURE — 77063 BREAST TOMOSYNTHESIS BI: CPT

## 2020-03-13 PROCEDURE — 77067 SCR MAMMO BI INCL CAD: CPT

## 2020-06-01 ENCOUNTER — TRANSCRIBE ORDERS (OUTPATIENT)
Dept: ADMINISTRATIVE | Facility: HOSPITAL | Age: 50
End: 2020-06-01

## 2020-06-01 DIAGNOSIS — Z01.818 PRE-OP TESTING: Primary | ICD-10-CM

## 2020-06-02 ENCOUNTER — LAB (OUTPATIENT)
Dept: LAB | Facility: HOSPITAL | Age: 50
End: 2020-06-02

## 2020-06-02 PROCEDURE — U0003 INFECTIOUS AGENT DETECTION BY NUCLEIC ACID (DNA OR RNA); SEVERE ACUTE RESPIRATORY SYNDROME CORONAVIRUS 2 (SARS-COV-2) (CORONAVIRUS DISEASE [COVID-19]), AMPLIFIED PROBE TECHNIQUE, MAKING USE OF HIGH THROUGHPUT TECHNOLOGIES AS DESCRIBED BY CMS-2020-01-R: HCPCS | Performed by: INTERNAL MEDICINE

## 2020-06-03 LAB
COVID LABCORP PRIORITY: NORMAL
SARS-COV-2 RNA RESP QL NAA+PROBE: NOT DETECTED

## 2020-06-05 ENCOUNTER — ANESTHESIA EVENT (OUTPATIENT)
Dept: GASTROENTEROLOGY | Facility: HOSPITAL | Age: 50
End: 2020-06-05

## 2020-06-05 ENCOUNTER — TELEPHONE (OUTPATIENT)
Dept: GASTROENTEROLOGY | Facility: CLINIC | Age: 50
End: 2020-06-05

## 2020-06-05 ENCOUNTER — HOSPITAL ENCOUNTER (OUTPATIENT)
Facility: HOSPITAL | Age: 50
Setting detail: HOSPITAL OUTPATIENT SURGERY
Discharge: HOME | End: 2020-06-05
Attending: INTERNAL MEDICINE | Admitting: INTERNAL MEDICINE

## 2020-06-05 ENCOUNTER — ANESTHESIA (OUTPATIENT)
Dept: GASTROENTEROLOGY | Facility: HOSPITAL | Age: 50
End: 2020-06-05

## 2020-06-05 VITALS
DIASTOLIC BLOOD PRESSURE: 84 MMHG | WEIGHT: 168 LBS | BODY MASS INDEX: 24.05 KG/M2 | OXYGEN SATURATION: 100 % | HEIGHT: 70 IN | SYSTOLIC BLOOD PRESSURE: 141 MMHG | RESPIRATION RATE: 16 BRPM | TEMPERATURE: 97.6 F | HEART RATE: 50 BPM

## 2020-06-05 LAB — B-HCG UR QL: NEGATIVE

## 2020-06-05 PROCEDURE — 81025 URINE PREGNANCY TEST: CPT | Performed by: ANESTHESIOLOGY

## 2020-06-05 PROCEDURE — 45378 DIAGNOSTIC COLONOSCOPY: CPT | Performed by: INTERNAL MEDICINE

## 2020-06-05 PROCEDURE — 25010000002 PROPOFOL 10 MG/ML EMULSION: Performed by: NURSE ANESTHETIST, CERTIFIED REGISTERED

## 2020-06-05 RX ORDER — LIDOCAINE HYDROCHLORIDE 10 MG/ML
0.5 INJECTION, SOLUTION EPIDURAL; INFILTRATION; INTRACAUDAL; PERINEURAL ONCE AS NEEDED
Status: DISCONTINUED | OUTPATIENT
Start: 2020-06-05 | End: 2020-06-05 | Stop reason: HOSPADM

## 2020-06-05 RX ORDER — PROPOFOL 10 MG/ML
VIAL (ML) INTRAVENOUS AS NEEDED
Status: DISCONTINUED | OUTPATIENT
Start: 2020-06-05 | End: 2020-06-05 | Stop reason: SURG

## 2020-06-05 RX ORDER — SODIUM CHLORIDE 0.9 % (FLUSH) 0.9 %
10 SYRINGE (ML) INJECTION AS NEEDED
Status: DISCONTINUED | OUTPATIENT
Start: 2020-06-05 | End: 2020-06-05 | Stop reason: HOSPADM

## 2020-06-05 RX ORDER — SODIUM CHLORIDE 9 MG/ML
500 INJECTION, SOLUTION INTRAVENOUS CONTINUOUS PRN
Status: DISCONTINUED | OUTPATIENT
Start: 2020-06-05 | End: 2020-06-05 | Stop reason: HOSPADM

## 2020-06-05 RX ADMIN — PROPOFOL 50 MG: 10 INJECTION, EMULSION INTRAVENOUS at 09:12

## 2020-06-05 RX ADMIN — PROPOFOL 50 MG: 10 INJECTION, EMULSION INTRAVENOUS at 09:01

## 2020-06-05 RX ADMIN — PROPOFOL 50 MG: 10 INJECTION, EMULSION INTRAVENOUS at 09:00

## 2020-06-05 RX ADMIN — SODIUM CHLORIDE: 0.9 INJECTION, SOLUTION INTRAVENOUS at 08:58

## 2020-06-05 RX ADMIN — PROPOFOL 100 MG: 10 INJECTION, EMULSION INTRAVENOUS at 09:06

## 2020-06-05 RX ADMIN — PROPOFOL 50 MG: 10 INJECTION, EMULSION INTRAVENOUS at 09:10

## 2020-06-05 NOTE — ANESTHESIA POSTPROCEDURE EVALUATION
Patient: Dulce Maria Bernal    Procedure Summary     Date:  06/05/20 Room / Location:  [facility] ENDOSCOPY 6 / [facility] ENDOSCOPY    Anesthesia Start:  0858 Anesthesia Stop:  0915    Procedure:  COLONOSCOPY WITH ANESTHESIA (N/A ) Diagnosis:       Crohn's disease without complication, unspecified gastrointestinal tract location (CMS/HCC)      (Crohn's disease without complication, unspecified gastrointestinal tract location (CMS/HCC) [K50.90])    Surgeon:  Garry Faria DO Provider:  Smith Ferguson CRNA    Anesthesia Type:  MAC ASA Status:  2          Anesthesia Type: MAC    Vitals  Vitals Value Taken Time   /74 6/5/2020  9:20 AM   Temp     Pulse 63 6/5/2020  9:21 AM   Resp 14 6/5/2020  9:18 AM   SpO2 99 % 6/5/2020  9:21 AM   Vitals shown include unvalidated device data.        Post Anesthesia Care and Evaluation    Patient location during evaluation: PHASE II  Patient participation: complete - patient participated  Level of consciousness: awake and alert  Pain score: 0  Pain management: adequate  Airway patency: patent  Anesthetic complications: No anesthetic complications  PONV Status: none  Cardiovascular status: acceptable and stable  Respiratory status: acceptable  Hydration status: acceptable

## 2020-06-05 NOTE — H&P
[facility] Gastroenterology  Pre Procedure History & Physical    Chief Complaint:   Crohn's dz    Subjective     HPI:   Crohn's dz    Past Medical History:   Past Medical History:   Diagnosis Date   • Crohn's disease (CMS/HCC)    • Ulcerative colitis (CMS/HCC)    • Ulcerative proctitis (CMS/HCC)        Past Surgical History:  Past Surgical History:   Procedure Laterality Date   • AUGMENTATION MAMMAPLASTY     •  SECTION     • COLONOSCOPY  2014    RECALL 3 YRS PER ESAU   • COLONOSCOPY N/A 2017    Procedure: COLONOSCOPY WITH ANESTHESIA;  Surgeon: Garry Faria DO;  Location: [facility] ENDOSCOPY;  Service:    • DILATATION AND CURETTAGE      X2   • MICRODISCECTOMY MINIMALLY INVASIVE OF LUMBAR SPINE W/ C-ARM     • TOTAL HIP ARTHROPLASTY         Family History:  Family History   Problem Relation Age of Onset   • Breast cancer Maternal Grandmother    • Crohn's disease Sister    • No Known Problems Mother    • No Known Problems Father    • No Known Problems Brother    • No Known Problems Daughter    • No Known Problems Son    • No Known Problems Paternal Grandmother    • No Known Problems Maternal Aunt    • No Known Problems Paternal Aunt    • Colon cancer Neg Hx    • Colon polyps Neg Hx    • Esophageal cancer Neg Hx    • Liver cancer Neg Hx    • Liver disease Neg Hx    • Rectal cancer Neg Hx    • Stomach cancer Neg Hx    • BRCA 1/2 Neg Hx    • Endometrial cancer Neg Hx    • Ovarian cancer Neg Hx        Social History:   reports that she has never smoked. She has never used smokeless tobacco. She reports that she drinks alcohol. She reports that she does not use drugs.    Medications:   Prior to Admission medications    Medication Sig Start Date End Date Taking? Authorizing Provider   CLENPIQ 10-3.5-12 MG-GM -GM/160ML solution Take 1 each by mouth Take As Directed. 3/12/20  Yes Harvinder Jeffers APRN TAYTULLA 1-20 MG-MCG(24) capsule 1 tablet Daily. 10/29/18  Yes Provider, MD Millie  "  triamterene-hydrochlorothiazide (DYAZIDE) 37.5-25 MG per capsule TAKE ONE CAPSULE BY MOUTH EVERY DAY 4/9/17  Yes Provider, Historical, MD       Allergies:  Patient has no known allergies.    ROS:    General: Weight stable  Resp: No SOA  Cardiovascular: No CP    Objective     Blood pressure 152/97, pulse 73, temperature 97.6 °F (36.4 °C), temperature source Temporal, resp. rate 18, height 177.8 cm (70\"), weight 76.2 kg (168 lb), last menstrual period 05/29/2020, SpO2 99 %, not currently breastfeeding.    Physical Exam   Constitutional: Pt is oriented to person, place, and in no distress.   HENT: Mouth/Throat: Oropharynx is clear.   Cardiovascular: Normal rate, regular rhythm.    Pulmonary/Chest: Effort normal. No respiratory distress. No  wheezes.   Abdominal: Soft. Non-distended.  Skin: Skin is warm and dry.   Psychiatric: Mood, memory, affect and judgment appear normal.     Assessment/Plan     Diagnosis:  Crohn's dz    Anticipated Surgical Procedure:  C-scope    The risks, benefits, and alternatives of this procedure have been discussed with the patient or the responsible party- the patient understands and agrees to proceed.        "

## 2020-06-05 NOTE — ANESTHESIA PREPROCEDURE EVALUATION
Anesthesia Evaluation     Patient summary reviewed   no history of anesthetic complications:  NPO Solid Status: > 8 hours  NPO Liquid Status: > 4 hours           Airway   Mallampati: II  TM distance: >3 FB  Neck ROM: full  no difficulty expected  Dental - normal exam     Pulmonary - negative pulmonary ROS and normal exam    breath sounds clear to auscultation  Cardiovascular - negative cardio ROS and normal exam  Exercise tolerance: excellent (>7 METS)    Rhythm: regular  Rate: normal        Neuro/Psych- negative ROS  GI/Hepatic/Renal/Endo      ROS Comment: Ulcerative colitis    Musculoskeletal (-) negative ROS    Abdominal  - normal exam   Substance History - negative use     OB/GYN negative ob/gyn ROS         Other - negative ROS                         Anesthesia Plan    ASA 2     MAC     intravenous induction     Anesthetic plan, all risks, benefits, and alternatives have been provided, discussed and informed consent has been obtained with: patient and spouse/significant other.    Plan discussed with CRNA.

## 2020-08-17 RX ORDER — AZATHIOPRINE 50 MG/1
100 TABLET ORAL DAILY
Qty: 180 TABLET | Refills: 3 | Status: SHIPPED | OUTPATIENT
Start: 2020-08-17 | End: 2021-06-18 | Stop reason: SDUPTHER

## 2020-12-11 RX ORDER — AZATHIOPRINE 50 MG/1
TABLET ORAL
Qty: 180 TABLET | Refills: 3 | OUTPATIENT
Start: 2020-12-11

## 2020-12-11 NOTE — TELEPHONE ENCOUNTER
Called into [facility] [phone] azathioprine (imuran)50mg 2 daily #180 2 refills.

## 2021-05-07 ENCOUNTER — OFFICE VISIT (OUTPATIENT)
Dept: OBGYN CLINIC | Age: 51
End: 2021-05-07
Payer: COMMERCIAL

## 2021-05-07 VITALS
HEIGHT: 70 IN | SYSTOLIC BLOOD PRESSURE: 120 MMHG | DIASTOLIC BLOOD PRESSURE: 78 MMHG | WEIGHT: 185 LBS | BODY MASS INDEX: 26.48 KG/M2

## 2021-05-07 DIAGNOSIS — Z12.4 SCREENING FOR CERVICAL CANCER: ICD-10-CM

## 2021-05-07 DIAGNOSIS — Z01.419 ENCOUNTER FOR GYNECOLOGICAL EXAMINATION WITHOUT ABNORMAL FINDING: Primary | ICD-10-CM

## 2021-05-07 DIAGNOSIS — Z12.31 ENCOUNTER FOR SCREENING MAMMOGRAM FOR MALIGNANT NEOPLASM OF BREAST: ICD-10-CM

## 2021-05-07 PROCEDURE — 99396 PREV VISIT EST AGE 40-64: CPT | Performed by: NURSE PRACTITIONER

## 2021-05-07 RX ORDER — NORETHINDRONE ACETATE AND ETHINYL ESTRADIOL, AND FERROUS FUMARATE 1MG-20(24)
KIT ORAL
Qty: 84 CAPSULE | Refills: 3 | Status: SHIPPED | OUTPATIENT
Start: 2021-05-07 | End: 2021-07-27

## 2021-05-07 ASSESSMENT — ENCOUNTER SYMPTOMS
ALLERGIC/IMMUNOLOGIC NEGATIVE: 1
DIARRHEA: 0
RESPIRATORY NEGATIVE: 1
GASTROINTESTINAL NEGATIVE: 1
EYES NEGATIVE: 1
CONSTIPATION: 0

## 2021-05-07 NOTE — PROGRESS NOTES
Solomon Diop is a 53 y.o. female who presents today for her medical conditions/ complaints as noted below. Solomon Diop is c/o of Gynecologic Exam        HPI   Pt presents for annual exam and pap smear. Needs order for screening mammogram. Doing well with OCP, Tawanna Ogden and would like name brand, has worked well for her. Mammo:2020  Pap smear:  Contraception:OCP     P:2  Ab:2  Bone density:NA   Colonoscopy:2020  Patient's last menstrual period was 2021 (exact date). 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 2016    Lumbar radiculopathy, right 2016     Past Surgical History:   Procedure Laterality Date    BREAST ENHANCEMENT SURGERY       SECTION      COLONOSCOPY      2020    DILATION AND CURETTAGE OF UTERUS      JOINT REPLACEMENT Left     LUMBAR SPINE SURGERY Right 2016    RIGHT L5-S1 MICRODISCECTOMY  performed by [facility] Gipson [facility] at [facility] OR     Family History   Problem Relation Age of Onset    Diabetes Paternal Grandmother     Diabetes Father     Crohn's Disease Sister      Social History     Tobacco Use    Smoking status: Never Smoker    Smokeless tobacco: Never Used   Substance Use Topics    Alcohol use: Yes     Alcohol/week: 2.0 standard drinks     Types: 2 Glasses of wine per week     Comment: daily       Current Outpatient Medications   Medication Sig Dispense Refill    TAYTULLA 1-20 MG-MCG(24) CAPS TAKE 1 CAPSULE BY MOUTH EVERY DAY 84 capsule 3    LORazepam (ATIVAN) 1 MG tablet Take 1 mg by mouth every 6 hours as needed for Anxiety.       triamterene-hydrochlorothiazide (DYAZIDE) 37.5-25 MG per capsule Take 1 capsule by mouth every morning      mesalamine (LIALDA) 1.2 G EC tablet Take 4,800 mg by mouth daily      azaTHIOprine 100 MG TABS Take 100 mg by mouth daily      cyclobenzaprine (FLEXERIL) 10 MG tablet Take 10 mg by mouth 3 times daily as needed for Muscle spasms       No current facility-administered medications for this visit. No Known Allergies  Vitals:    05/07/21 1304   BP: 120/78     Body mass index is 26.54 kg/m². Review of Systems   Constitutional: Negative. HENT: Negative. Eyes: Negative. Respiratory: Negative. Cardiovascular: Negative. Gastrointestinal: Negative. Negative for constipation and diarrhea. Endocrine: Negative. Genitourinary: Negative. Negative for frequency, menstrual problem and urgency. Musculoskeletal: Negative. Skin: Negative. Allergic/Immunologic: Negative. Neurological: Negative. Hematological: Negative. Psychiatric/Behavioral: Negative. All other systems reviewed and are negative. Physical Exam  Vitals signs and nursing note reviewed. Constitutional:       Appearance: She is well-developed. HENT:      Head: Normocephalic. Neck:      Musculoskeletal: Normal range of motion and neck supple. Thyroid: No thyroid mass or thyromegaly. Cardiovascular:      Rate and Rhythm: Normal rate and regular rhythm. Pulmonary:      Effort: Pulmonary effort is normal.      Breath sounds: Normal breath sounds. Chest:      Breasts:         Right: No inverted nipple, mass, nipple discharge or skin change. Left: No inverted nipple, mass, nipple discharge or skin change. Abdominal:      Palpations: Abdomen is soft. There is no mass. Tenderness: There is no abdominal tenderness. Genitourinary:     General: Normal vulva. Vagina: Normal.      Cervix: No cervical motion tenderness. Uterus: Normal. Not enlarged. Adnexa:         Right: No mass or tenderness. Left: No mass or tenderness. Comments: Pap collected  Musculoskeletal: Normal range of motion. Skin:     General: Skin is warm and dry.    Neurological:      Mental Status: She is alert and oriented to person, appointments, and call your doctor if you are having problems. It's also a good idea to know your test results and keep a list of the medicines you take. How do you do a breast self-exam?  · The best time to examine your breasts is usually one week after your menstrual period begins. Your breasts should not be tender then. If you do not have periods, you might do your exam on a day of the month that is easy to remember. · To examine your breasts:  ? Remove all your clothes above the waist and lie down. When you are lying down, your breast tissue spreads evenly over your chest wall, which makes it easier to feel all your breast tissue. ? Use the padsnot the fingertipsof the 3 middle fingers of your left hand to check your right breast. Move your fingers slowly in small coin-sized circles that overlap. ? Use three levels of pressure to feel of all your breast tissue. Use light pressure to feel the tissue close to the skin surface. Use medium pressure to feel a little deeper. Use firm pressure to feel your tissue close to your breastbone and ribs. Use each pressure level to feel your breast tissue before moving on to the next spot. ? Check your entire breast, moving up and down as if following a strip from the collarbone to the bra line, and from the armpit to the ribs. Repeat until you have covered the entire breast.  ? Repeat this procedure for your left breast, using the pads of the 3 middle fingers of your right hand. · To examine your breasts while in the shower:  ? Place one arm over your head and lightly soap your breast on that side. ? Using the pads of your fingers, gently move your hand over your breast (in the strip pattern described above), feeling carefully for any lumps or changes. ? Repeat for the other breast.  · Have your doctor inspect anything you notice to see if you need further testing. Where can you learn more? Go to [email or web address]://vinod.[employer]. org and sign in to your MyChart account. Enter P148 in the [facility] box to learn more about \"Breast Self-Exam: Care Instructions. \"     If you do not have an account, please click on the \"Sign Up Now\" link. Current as of: December 17, 2020               Content Version: 12.8  © 9662-4050 Healthwise, Incorporated. Care instructions adapted under license by [facility] ([facility]). If you have questions about a medical condition or this instruction, always ask your healthcare professional. [address] any warranty or liability for your use of this information.

## 2021-05-07 NOTE — PROGRESS NOTES
Pt presents today for pap smear and breast exam. She states that she was told she did not need to be on the generic Taytulla and the last refill she had was the generic.      Mammo:2020  Pap smear:  Contraception:OCP     P:2  Ab:2  Bone density:NA   Colonoscopy:2020

## 2021-05-07 NOTE — PATIENT INSTRUCTIONS
feel your breast tissue before moving on to the next spot. ? Check your entire breast, moving up and down as if following a strip from the collarbone to the bra line, and from the armpit to the ribs. Repeat until you have covered the entire breast.  ? Repeat this procedure for your left breast, using the pads of the 3 middle fingers of your right hand. · To examine your breasts while in the shower:  ? Place one arm over your head and lightly soap your breast on that side. ? Using the pads of your fingers, gently move your hand over your breast (in the strip pattern described above), feeling carefully for any lumps or changes. ? Repeat for the other breast.  · Have your doctor inspect anything you notice to see if you need further testing. Where can you learn more? Go to https://Lovlipechuyitaeb.Bango. org and sign in to your Solio account. Enter P148 in the Nubank box to learn more about \"Breast Self-Exam: Care Instructions. \"     If you do not have an account, please click on the \"Sign Up Now\" link. Current as of: December 17, 2020               Content Version: 12.8  © 1455-3094 Healthwise, Incorporated. Care instructions adapted under license by [facility] ([facility]). If you have questions about a medical condition or this instruction, always ask your healthcare professional. [address] any warranty or liability for your use of this information.

## 2021-06-11 ENCOUNTER — TELEPHONE (OUTPATIENT)
Dept: GASTROENTEROLOGY | Facility: CLINIC | Age: 51
End: 2021-06-11

## 2021-06-15 RX ORDER — MESALAMINE 1.2 G/1
1200 TABLET, DELAYED RELEASE ORAL DAILY
Qty: 30 TABLET | Refills: 6 | Status: SHIPPED | OUTPATIENT
Start: 2021-06-15 | End: 2021-06-18 | Stop reason: SDUPTHER

## 2021-06-16 NOTE — PROGRESS NOTES
Chief Complaint   Patient presents with   • Crohn's Disease     crohn's needs lialda refilled [facility] imuran needs [facility]       PCP: Junior Calvert MD  REFER: No ref. provider found    Subjective     HPI    VIDEO VISIT:    Dulce Maria Bernal is a 50 y.o. female who presents with a history of Crohn's Disease.   Status of disease is quiet and stable.  The severity is described as Mild.  She denies  abdominal cramping, diarrhea, bloody stool and anorexia.  Previous diagnostic test include  colonoscopy.    [facility]Scope (Dr Faria) 2020     Past Medical History:   Diagnosis Date   • Crohn's disease (CMS/HCC)    • Ulcerative colitis (CMS/HCC)    • Ulcerative proctitis (CMS/HCC)        Past Surgical History:   Procedure Laterality Date   • AUGMENTATION MAMMAPLASTY     •  SECTION     • COLONOSCOPY  2014    RECALL 3 YRS PER ESAU   • COLONOSCOPY N/A 2017    Procedure: COLONOSCOPY WITH ANESTHESIA;  Surgeon: Garry Faria DO;  Location: [facility] ENDOSCOPY;  Service:    • COLONOSCOPY N/A 2020    Procedure: COLONOSCOPY WITH ANESTHESIA;  Surgeon: Garry Faria DO;  Location: [facility] ENDOSCOPY;  Service: Gastroenterology;  Laterality: N/A;  preop: crohn's  postop; normal  Junior Calvert MD   • DILATATION AND CURETTAGE      X2   • MICRODISCECTOMY MINIMALLY INVASIVE OF LUMBAR SPINE W/ C-ARM     • TOTAL HIP ARTHROPLASTY         Outpatient Medications Marked as Taking for the 21 encounter (Telemedicine) with Harvinder Jeffers APRN   Medication Sig Dispense Refill   • azaTHIOprine (Imuran) 50 MG tablet Take 2 tablets by mouth Daily. 180 tablet 3   • mesalamine (LIALDA) 1.2 g EC tablet Take 1 tablet by mouth Daily for 30 days. 90 tablet 3   • TAYTULLA 1-20 MG-MCG(24) capsule 1 tablet Daily.     • triamterene-hydrochlorothiazide (DYAZIDE) 37.5-25 MG per capsule TAKE ONE CAPSULE BY MOUTH EVERY DAY  5   • [DISCONTINUED] azaTHIOprine (Imuran) 50 MG tablet Take 2 tablets by mouth  Daily. 180 tablet 3   • [DISCONTINUED] mesalamine (LIALDA) 1.2 g EC tablet Take 1 tablet by mouth Daily for 30 days. 30 tablet 6       No Known Allergies    Social History     Socioeconomic History   • Marital status:      Spouse name: Not on file   • Number of children: Not on file   • Years of education: Not on file   • Highest education level: Not on file   Tobacco Use   • Smoking status: Never Smoker   • Smokeless tobacco: Never Used   Substance and Sexual Activity   • Alcohol use: Yes     Comment: LITTLE BIT DAILY   • Drug use: No   • Sexual activity: Defer       Review of Systems   Constitutional: Negative for unexpected weight change.   Respiratory: Negative for shortness of breath.    Cardiovascular: Negative for chest pain.   Gastrointestinal: Negative for abdominal pain and anal bleeding.       Objective     There were no vitals filed for this visit.  There is no height or weight on file to calculate BMI.    Virtual Visit Physical Exam  Physical Exam   Constitutional: appears well-nourished.   HENT:   Head: Atraumatic.   Nose: Nose normal.   Eyes: EOM are normal. Right eye exhibits no discharge. Left eye exhibits no discharge.   Neck: Neck normal appearance.  Pulmonary/Chest: Effort normal.   Abdominal: Abdomen appears normal.   Musculoskeletal: Normal range of motion.   Neurological:alert.   Skin: Skin is dry.   Psychiatric:  normal mood and affect.        Imaging Results (Most Recent)     None          There is no height or weight on file to calculate BMI.    Assessment/Plan     Diagnoses and all orders for this visit:    1. Crohn's disease without complication, unspecified gastrointestinal tract location (CMS/HCC) (Primary)    Other orders  -     mesalamine (LIALDA) 1.2 g EC tablet; Take 1 tablet by mouth Daily for 30 days.  Dispense: 90 tablet; Refill: 3  -     azaTHIOprine (Imuran) 50 MG tablet; Take 2 tablets by mouth Daily.  Dispense: 180 tablet; Refill: 3        * Surgery not found  *      Colonoscopy is up to date, procedure can be performed earlier if clinically indicated    This was an audio and video enabled telemedicine encounter. This visit was conducted with me in my office and Dulce Maria Bernal at their home    Harvinder Jeffers, APRN  06/18/21          There are no Patient Instructions on file for this visit.

## 2021-06-18 ENCOUNTER — TELEMEDICINE (OUTPATIENT)
Dept: GASTROENTEROLOGY | Facility: CLINIC | Age: 51
End: 2021-06-18

## 2021-06-18 ENCOUNTER — HOSPITAL ENCOUNTER (OUTPATIENT)
Dept: WOMENS IMAGING | Age: 51
Discharge: HOME | End: 2021-06-18
Payer: COMMERCIAL

## 2021-06-18 DIAGNOSIS — Z12.31 ENCOUNTER FOR SCREENING MAMMOGRAM FOR MALIGNANT NEOPLASM OF BREAST: ICD-10-CM

## 2021-06-18 DIAGNOSIS — K50.90 CROHN'S DISEASE WITHOUT COMPLICATION, UNSPECIFIED GASTROINTESTINAL TRACT LOCATION (HCC): Primary | ICD-10-CM

## 2021-06-18 PROCEDURE — 77063 BREAST TOMOSYNTHESIS BI: CPT

## 2021-06-18 PROCEDURE — 99213 OFFICE O/P EST LOW 20 MIN: CPT | Performed by: NURSE PRACTITIONER

## 2021-06-18 RX ORDER — MESALAMINE 1.2 G/1
1200 TABLET, DELAYED RELEASE ORAL DAILY
Qty: 90 TABLET | Refills: 3 | Status: SHIPPED | OUTPATIENT
Start: 2021-06-18 | End: 2021-07-18

## 2021-06-18 RX ORDER — AZATHIOPRINE 50 MG/1
100 TABLET ORAL DAILY
Qty: 180 TABLET | Refills: 3 | Status: SHIPPED | OUTPATIENT
Start: 2021-06-18 | End: 2022-09-19 | Stop reason: SDUPTHER

## 2021-07-27 RX ORDER — NORETHINDRONE ACETATE AND ETHINYL ESTRADIOL, AND FERROUS FUMARATE 1MG-20(24)
1 KIT ORAL DAILY
Qty: 28 CAPSULE | Refills: 3 | Status: SHIPPED | OUTPATIENT
Start: 2021-07-27 | End: 2021-10-12

## 2021-10-12 RX ORDER — NORETHINDRONE ACETATE AND ETHINYL ESTRADIOL, AND FERROUS FUMARATE 1MG-20(24)
KIT ORAL
Qty: 84 CAPSULE | Refills: 1 | Status: SHIPPED | OUTPATIENT
Start: 2021-10-12 | End: 2022-03-21

## 2022-01-21 ENCOUNTER — TELEPHONE (OUTPATIENT)
Dept: GASTROENTEROLOGY | Facility: CLINIC | Age: 52
End: 2022-01-21

## 2022-01-21 NOTE — TELEPHONE ENCOUNTER
Called into [facility] [phone] azathioprine (imuran)50mg 2 100 mg qd #180 2 refills

## 2022-01-25 ENCOUNTER — TELEPHONE (OUTPATIENT)
Dept: GASTROENTEROLOGY | Facility: CLINIC | Age: 52
End: 2022-01-25

## 2022-01-25 NOTE — TELEPHONE ENCOUNTER
----- Message from RAVEN Mclaughlin sent at 1/24/2022 11:21 AM CST -----  I reviewed her labs  Everything looks good  Can you please let her know

## 2022-03-21 RX ORDER — NORETHINDRONE ACETATE AND ETHINYL ESTRADIOL, AND FERROUS FUMARATE 1MG-20(24)
KIT ORAL
Qty: 84 CAPSULE | Refills: 1 | Status: SHIPPED | OUTPATIENT
Start: 2022-03-21 | End: 2022-05-13 | Stop reason: SDUPTHER

## 2022-05-13 ENCOUNTER — OFFICE VISIT (OUTPATIENT)
Dept: OBGYN CLINIC | Age: 52
End: 2022-05-13
Payer: COMMERCIAL

## 2022-05-13 VITALS
BODY MASS INDEX: 25.05 KG/M2 | SYSTOLIC BLOOD PRESSURE: 136 MMHG | HEART RATE: 62 BPM | WEIGHT: 175 LBS | HEIGHT: 70 IN | DIASTOLIC BLOOD PRESSURE: 89 MMHG

## 2022-05-13 DIAGNOSIS — Z12.39 ENCOUNTER FOR SCREENING BREAST EXAMINATION: ICD-10-CM

## 2022-05-13 DIAGNOSIS — Z12.4 SCREENING FOR CERVICAL CANCER: ICD-10-CM

## 2022-05-13 DIAGNOSIS — Z30.41 ORAL CONTRACEPTIVE PILL SURVEILLANCE: ICD-10-CM

## 2022-05-13 DIAGNOSIS — Z12.31 ENCOUNTER FOR SCREENING MAMMOGRAM FOR MALIGNANT NEOPLASM OF BREAST: ICD-10-CM

## 2022-05-13 DIAGNOSIS — Z11.51 SCREENING FOR HPV (HUMAN PAPILLOMAVIRUS): ICD-10-CM

## 2022-05-13 DIAGNOSIS — Z01.419 ENCOUNTER FOR GYNECOLOGICAL EXAMINATION WITHOUT ABNORMAL FINDING: Primary | ICD-10-CM

## 2022-05-13 PROCEDURE — 99396 PREV VISIT EST AGE 40-64: CPT | Performed by: NURSE PRACTITIONER

## 2022-05-13 RX ORDER — NORETHINDRONE ACETATE AND ETHINYL ESTRADIOL, AND FERROUS FUMARATE 1MG-20(24)
KIT ORAL
Qty: 84 CAPSULE | Refills: 3 | Status: CANCELLED | OUTPATIENT
Start: 2022-05-13

## 2022-05-13 RX ORDER — NORETHINDRONE ACETATE AND ETHINYL ESTRADIOL, AND FERROUS FUMARATE 1MG-20(24)
KIT ORAL
Qty: 84 CAPSULE | Refills: 3 | Status: SHIPPED | OUTPATIENT
Start: 2022-05-13

## 2022-05-13 ASSESSMENT — ENCOUNTER SYMPTOMS
ALLERGIC/IMMUNOLOGIC NEGATIVE: 1
DIARRHEA: 0
EYES NEGATIVE: 1
GASTROINTESTINAL NEGATIVE: 1
RESPIRATORY NEGATIVE: 1
CONSTIPATION: 0

## 2022-05-13 NOTE — PATIENT INSTRUCTIONS
Patient Education        Breast Self-Exam: Care Instructions  Your Care Instructions     A breast self-exam is when you check your breasts for lumps or changes. This regular exam helps you learn how your breasts normally look and feel. Mostbreast problems or changes are not because of cancer. Breast self-exam is not a substitute for a mammogram. Having regular breast exams by your doctor and regular mammograms improve your chances of finding anyproblems with your breasts. Some women set a time each month to do a step-by-step breast self-exam. Other women like a less formal system. They might look at their breasts as they brushtheir teeth, or feel their breasts once in a while in the shower. If you notice a change in your breast, tell your doctor. Follow-up care is a key part of your treatment and safety. Be sure to make and go to all appointments, and call your doctor if you are having problems. It's also a good idea to know your test results and keep alist of the medicines you take. How do you do a breast self-exam?   The best time to examine your breasts is usually one week after your menstrual period begins. Your breasts should not be tender then. If you do not have periods, you might do your exam on a day of the month that is easy to remember.  To examine your breasts:  ? Remove all your clothes above the waist and lie down. When you are lying down, your breast tissue spreads evenly over your chest wall, which makes it easier to feel all your breast tissue. ? Use the pads--not the fingertips--of the 3 middle fingers of your left hand to check your right breast. Move your fingers slowly in small coin-sized circles that overlap. ? Use three levels of pressure to feel of all your breast tissue. Use light pressure to feel the tissue close to the skin surface. Use medium pressure to feel a little deeper. Use firm pressure to feel your tissue close to your breastbone and ribs.  Use each pressure level to feel your breast tissue before moving on to the next spot. ? Check your entire breast, moving up and down as if following a strip from the collarbone to the bra line, and from the armpit to the ribs. Repeat until you have covered the entire breast.  ? Repeat this procedure for your left breast, using the pads of the 3 middle fingers of your right hand.  To examine your breasts while in the shower:  ? Place one arm over your head and lightly soap your breast on that side. ? Using the pads of your fingers, gently move your hand over your breast (in the strip pattern described above), feeling carefully for any lumps or changes. ? Repeat for the other breast.   Have your doctor inspect anything you notice to see if you need further testing. Where can you learn more? Go to https://Senior Home CarepeEl Teatroeb.AffinityClick. org and sign in to your Turnstyle Solutions account. Enter P148 in the Uptake box to learn more about \"Breast Self-Exam: Care Instructions. \"     If you do not have an account, please click on the \"Sign Up Now\" link. Current as of: September 8, 2021               Content Version: 13.2  © 2006-2022 Doist. Care instructions adapted under license by [facility] ([facility]). If you have questions about a medical condition or this instruction, always ask your healthcare professional. [place] [address] any warranty or liability for your use of this information. Patient Education        Well Visit, Ages 25 to 48: Care Instructions  Overview     Well visits can help you stay healthy. Your doctor has checked your overall health and may have suggested ways to take good care of yourself. Your doctor also may have recommended tests. At home, you can help prevent illness withhealthy eating, regular exercise, and other steps. Follow-up care is a key part of your treatment and safety. Be sure to make and go to all appointments, and call your doctor if you are having problems.  It's also a good idea to know your test results and keep alist of the medicines you take. How can you care for yourself at home?  Get screening tests that you and your doctor decide on. Screening helps find diseases before any symptoms appear.  Eat healthy foods. Choose fruits, vegetables, whole grains, protein, and low-fat dairy foods. Limit fat, especially saturated fat. Reduce salt in your diet.  Limit alcohol. If you are a man, have no more than 2 drinks a day or 14 drinks a week. If you are a woman, have no more than 1 drink a day or 7 drinks a week.  Get at least 30 minutes of physical activity on most days of the week. Walking is a good choice. You also may want to do other activities, such as running, swimming, cycling, or playing tennis or team sports. Discuss any changes in your exercise program with your doctor.  Reach and stay at a healthy weight. This will lower your risk for many problems, such as obesity, diabetes, heart disease, and high blood pressure.  Do not smoke or allow others to smoke around you. If you need help quitting, talk to your doctor about stop-smoking programs and medicines. These can increase your chances of quitting for good.  Care for your mental health. It is easy to get weighed down by worry and stress. Learn strategies to manage stress, like deep breathing and mindfulness, and stay connected with your family and community. If you find you often feel sad or hopeless, talk with your doctor. Treatment can help.  Talk to your doctor about whether you have any risk factors for sexually transmitted infections (STIs). You can help prevent STIs if you wait to have sex with a new partner (or partners) until you've each been tested for STIs. It also helps if you use condoms (male or female condoms) and if you limit your sex partners to one person who only has sex with you. Vaccines are available for some STIs, such as HPV.    Use birth control if it's important to you to prevent pregnancy. Talk with your doctor about the choices available and what might be best for you.  If you think you may have a problem with alcohol or drug use, talk to your doctor. This includes prescription medicines (such as amphetamines and opioids) and illegal drugs (such as cocaine and methamphetamine). Your doctor can help you figure out what type of treatment is best for you.  Protect your skin from too much sun. When you're outdoors from 10 a.m. to 4 p.m., stay in the shade or cover up with clothing and a hat with a wide brim. Wear sunglasses that block UV rays. Even when it's cloudy, put broad-spectrum sunscreen (SPF 30 or higher) on any exposed skin.  See a dentist one or two times a year for checkups and to have your teeth cleaned.  Wear a seat belt in the car. When should you call for help? Watch closely for changes in your health, and be sure to contact your doctor if you have any problems or symptoms that concern you. Where can you learn more? Go to https://Transinsight.M.A. Transportation Services. org and sign in to your Given.to account. Enter P072 in the Wedding Spot box to learn more about \"Well Visit, Ages 25 to 48: Care Instructions. \"     If you do not have an account, please click on the \"Sign Up Now\" link. Current as of: October 6, 2021               Content Version: 13.2  © 7481-7028 Healthwise, Incorporated. Care instructions adapted under license by [facility] ([facility]). If you have questions about a medical condition or this instruction, always ask your healthcare professional. [place] [address] any warranty or liability for your use of this information.

## 2022-05-13 NOTE — PROGRESS NOTES
Pt presents today for pap smear and breast exam.     Mammo:2021  Pap smear:  Contraception:OCP     P:2  Ab:2  Bone density:NA   Colonoscopy:

## 2022-05-13 NOTE — PROGRESS NOTES
Sadi Fournier is a 49 y.o. female who presents today for her medical conditions/ complaints as noted below. Sadi Fournier is c/o of Annual Exam        HPI  Pt presents for annual exam and pap smear. Needs order for screening mammogram. Taking OCP and doing well- having light periods, sometimes will miss. Started menses at age 16. Having some nights sweats. Mammo:2021  Pap smear:  Contraception:OCP     P:2  Ab:2  Bone density:NA   Colonoscopy:  Patient's last menstrual period was 2022. [id number]    Past Medical History:   Diagnosis Date    Back disorder     bulging disk    Colitis     Connective tissue and disc stenosis of intervertebral foramina of lumbar region 2016    Lumbar radiculopathy, right 2016     Past Surgical History:   Procedure Laterality Date    BREAST ENHANCEMENT SURGERY       SECTION      COLONOSCOPY      2020    DILATION AND CURETTAGE OF UTERUS      JOINT REPLACEMENT Left     LUMBAR SPINE SURGERY Right 2016    RIGHT L5-S1 MICRODISCECTOMY  performed by D[facility] MD at [facility] OR     Family History   Problem Relation Age of Onset    Diabetes Paternal Grandmother     Diabetes Father     Crohn's Disease Sister     Breast Cancer Maternal Grandmother      Social History     Tobacco Use    Smoking status: Never Smoker    Smokeless tobacco: Never Used   Substance Use Topics    Alcohol use: Yes     Alcohol/week: 2.0 standard drinks     Types: 2 Glasses of wine per week     Comment: daily       Current Outpatient Medications   Medication Sig Dispense Refill    Norethin Ace-Eth Estrad-FE 1-20 MG-MCG(24) CAPS TAKE 1 CAPSULE BY MOUTH EVERY DAY 84 capsule 3    LORazepam (ATIVAN) 1 MG tablet Take 1 mg by mouth every 6 hours as needed for Anxiety.       triamterene-hydrochlorothiazide (DYAZIDE) 37.5-25 MG per capsule Take 1 capsule by mouth every morning      mesalamine (LIALDA) 1.2 G EC tablet Take 4,800 mg by mouth daily      azaTHIOprine 100 MG TABS Take 100 mg by mouth daily       No current facility-administered medications for this visit. No Known Allergies  Vitals:    05/13/22 1038   BP: 136/89   Pulse: 62     Body mass index is 25.11 kg/m². Review of Systems   Constitutional: Negative. HENT: Negative. Eyes: Negative. Respiratory: Negative. Cardiovascular: Negative. Gastrointestinal: Negative. Negative for constipation and diarrhea. Endocrine: Negative. Genitourinary: Negative. Negative for frequency, menstrual problem and urgency. Musculoskeletal: Negative. Skin: Negative. Allergic/Immunologic: Negative. Neurological: Negative. Hematological: Negative. Psychiatric/Behavioral: Negative. All other systems reviewed and are negative. Physical Exam  Vitals and nursing note reviewed. Constitutional:       Appearance: She is well-developed. HENT:      Head: Normocephalic. Neck:      Thyroid: No thyroid mass or thyromegaly. Cardiovascular:      Rate and Rhythm: Normal rate and regular rhythm. Pulmonary:      Effort: Pulmonary effort is normal.      Breath sounds: Normal breath sounds. Chest:   Breasts:      Right: No inverted nipple, mass, nipple discharge or skin change. Left: No inverted nipple, mass, nipple discharge or skin change. Comments: Bilateral implants intact  Abdominal:      Palpations: Abdomen is soft. There is no mass. Tenderness: There is no abdominal tenderness. Genitourinary:     General: Normal vulva. Vagina: Normal.      Cervix: No cervical motion tenderness. Uterus: Normal. Not enlarged. Adnexa:         Right: No mass or tenderness. Left: No mass or tenderness. Comments: Pap collected  Musculoskeletal:         General: Normal range of motion. Cervical back: Normal range of motion and neck supple. Skin:     General: Skin is warm and dry.    Neurological:      Mental Status: She is alert and oriented to person, place, and time. Psychiatric:         Attention and Perception: Attention normal.         Mood and Affect: Mood normal.         Speech: Speech normal.         Behavior: Behavior normal.         Thought Content: Thought content normal.         Cognition and Memory: Cognition normal.         Judgment: Judgment normal.          Diagnosis Orders   1. Encounter for gynecological examination without abnormal finding     2. Screening for cervical cancer  PAP SMEAR   3. Screening for HPV (human papillomavirus)  Human papillomavirus (HPV) DNA probe thin prep high risk   4. Encounter for screening mammogram for malignant neoplasm of breast  JUAN DAVID DIGITAL SCREEN W OR WO CAD BILATERAL   5. Encounter for screening breast examination     6. Oral contraceptive pill surveillance  Norethin Ace-Eth Estrad-FE 1-20 MG-MCG(24) CAPS       MEDICATIONS:  Orders Placed This Encounter   Medications    Norethin Ace-Eth Estrad-FE 1-20 MG-MCG(24) CAPS     Sig: TAKE 1 CAPSULE BY MOUTH EVERY DAY     Dispense:  84 capsule     Refill:  3       ORDERS:  Orders Placed This Encounter   Procedures    JUAN DAVID DIGITAL SCREEN W OR WO CAD BILATERAL    PAP SMEAR    Human papillomavirus (HPV) DNA probe thin prep high risk       PLAN:  Pap collected  Screening mammogram to [facility]  Refilled OCP- will considering stopping next year    Patient Instructions   Patient Education        Breast Self-Exam: Care Instructions  Your Care Instructions     A breast self-exam is when you check your breasts for lumps or changes. This regular exam helps you learn how your breasts normally look and feel. Mostbreast problems or changes are not because of cancer. Breast self-exam is not a substitute for a mammogram. Having regular breast exams by your doctor and regular mammograms improve your chances of finding anyproblems with your breasts. Some women set a time each month to do a step-by-step breast self-exam. Other women like a less formal system.  They might look at their breasts as they brushtheir teeth, or feel their breasts once in a while in the shower. If you notice a change in your breast, tell your doctor. Follow-up care is a key part of your treatment and safety. Be sure to make and go to all appointments, and call your doctor if you are having problems. It's also a good idea to know your test results and keep alist of the medicines you take. How do you do a breast self-exam?   The best time to examine your breasts is usually one week after your menstrual period begins. Your breasts should not be tender then. If you do not have periods, you might do your exam on a day of the month that is easy to remember.  To examine your breasts:  ? Remove all your clothes above the waist and lie down. When you are lying down, your breast tissue spreads evenly over your chest wall, which makes it easier to feel all your breast tissue. ? Use the pads--not the fingertips--of the 3 middle fingers of your left hand to check your right breast. Move your fingers slowly in small coin-sized circles that overlap. ? Use three levels of pressure to feel of all your breast tissue. Use light pressure to feel the tissue close to the skin surface. Use medium pressure to feel a little deeper. Use firm pressure to feel your tissue close to your breastbone and ribs. Use each pressure level to feel your breast tissue before moving on to the next spot. ? Check your entire breast, moving up and down as if following a strip from the collarbone to the bra line, and from the armpit to the ribs. Repeat until you have covered the entire breast.  ? Repeat this procedure for your left breast, using the pads of the 3 middle fingers of your right hand.  To examine your breasts while in the shower:  ? Place one arm over your head and lightly soap your breast on that side. ?  Using the pads of your fingers, gently move your hand over your breast (in the strip pattern described above), feeling carefully for any lumps or changes. ? Repeat for the other breast.   Have your doctor inspect anything you notice to see if you need further testing. Where can you learn more? Go to https://OngopeAlacritech.INCOM Storage. org and sign in to your Memrise account. Enter P148 in the Yippee Arts box to learn more about \"Breast Self-Exam: Care Instructions. \"     If you do not have an account, please click on the \"Sign Up Now\" link. Current as of: September 8, 2021               Content Version: 13.2  © 8990-6685 Healthwise, Incorporated. Care instructions adapted under license by [facility] ([facility]). If you have questions about a medical condition or this instruction, always ask your healthcare professional. [address] any warranty or liability for your use of this information.

## 2022-05-17 ENCOUNTER — TRANSCRIBE ORDERS (OUTPATIENT)
Dept: ADMINISTRATIVE | Facility: HOSPITAL | Age: 52
End: 2022-05-17

## 2022-05-17 DIAGNOSIS — Z12.31 ENCOUNTER FOR SCREENING MAMMOGRAM FOR MALIGNANT NEOPLASM OF BREAST: Primary | ICD-10-CM

## 2022-05-20 RX ORDER — FLUCONAZOLE 150 MG/1
150 TABLET ORAL
Qty: 2 TABLET | Refills: 0 | Status: SHIPPED | OUTPATIENT
Start: 2022-05-20 | End: 2022-05-26

## 2022-06-20 ENCOUNTER — APPOINTMENT (OUTPATIENT)
Dept: MAMMOGRAPHY | Facility: HOSPITAL | Age: 52
End: 2022-06-20

## 2022-07-22 ENCOUNTER — HOSPITAL ENCOUNTER (OUTPATIENT)
Dept: MAMMOGRAPHY | Facility: HOSPITAL | Age: 52
Discharge: HOME | End: 2022-07-22
Admitting: NURSE PRACTITIONER

## 2022-07-22 DIAGNOSIS — Z12.31 ENCOUNTER FOR SCREENING MAMMOGRAM FOR MALIGNANT NEOPLASM OF BREAST: ICD-10-CM

## 2022-07-22 PROCEDURE — 77067 SCR MAMMO BI INCL CAD: CPT

## 2022-07-22 PROCEDURE — 77063 BREAST TOMOSYNTHESIS BI: CPT

## 2022-09-06 RX ORDER — MESALAMINE 1.2 G/1
TABLET, DELAYED RELEASE ORAL
Qty: 90 TABLET | Refills: 3 | OUTPATIENT
Start: 2022-09-06

## 2022-09-13 ENCOUNTER — LAB (OUTPATIENT)
Dept: LAB | Facility: HOSPITAL | Age: 52
End: 2022-09-13

## 2022-09-13 ENCOUNTER — OFFICE VISIT (OUTPATIENT)
Dept: GASTROENTEROLOGY | Facility: CLINIC | Age: 52
End: 2022-09-13

## 2022-09-13 VITALS
WEIGHT: 184 LBS | HEART RATE: 74 BPM | HEIGHT: 70 IN | TEMPERATURE: 97 F | BODY MASS INDEX: 26.34 KG/M2 | SYSTOLIC BLOOD PRESSURE: 126 MMHG | OXYGEN SATURATION: 100 % | DIASTOLIC BLOOD PRESSURE: 70 MMHG

## 2022-09-13 DIAGNOSIS — K50.90 CROHN'S DISEASE WITHOUT COMPLICATION, UNSPECIFIED GASTROINTESTINAL TRACT LOCATION: Primary | ICD-10-CM

## 2022-09-13 DIAGNOSIS — R15.2 FECAL URGENCY: ICD-10-CM

## 2022-09-13 DIAGNOSIS — K50.90 CROHN'S DISEASE WITHOUT COMPLICATION, UNSPECIFIED GASTROINTESTINAL TRACT LOCATION: ICD-10-CM

## 2022-09-13 LAB
ALBUMIN SERPL-MCNC: 4.5 G/DL (ref 3.5–5.2)
ALBUMIN/GLOB SERPL: 1.6 G/DL
ALP SERPL-CCNC: 60 U/L (ref 39–117)
ALT SERPL W P-5'-P-CCNC: 11 U/L (ref 1–33)
ANION GAP SERPL CALCULATED.3IONS-SCNC: 9 MMOL/L (ref 5–15)
AST SERPL-CCNC: 15 U/L (ref 1–32)
BILIRUB SERPL-MCNC: 0.6 MG/DL (ref 0–1.2)
BUN SERPL-MCNC: 18 MG/DL (ref 6–20)
BUN/CREAT SERPL: 18.4 (ref 7–25)
CALCIUM SPEC-SCNC: 9.1 MG/DL (ref 8.6–10.5)
CHLORIDE SERPL-SCNC: 100 MMOL/L (ref 98–107)
CO2 SERPL-SCNC: 29 MMOL/L (ref 22–29)
CREAT SERPL-MCNC: 0.98 MG/DL (ref 0.57–1)
DEPRECATED RDW RBC AUTO: 50.2 FL (ref 37–54)
EGFRCR SERPLBLD CKD-EPI 2021: 70 ML/MIN/1.73
ERYTHROCYTE [DISTWIDTH] IN BLOOD BY AUTOMATED COUNT: 12.7 % (ref 12.3–15.4)
GLOBULIN UR ELPH-MCNC: 2.9 GM/DL
GLUCOSE SERPL-MCNC: 112 MG/DL (ref 65–99)
HCT VFR BLD AUTO: 40.8 % (ref 34–46.6)
HGB BLD-MCNC: 13.9 G/DL (ref 12–15.9)
MCH RBC QN AUTO: 36.7 PG (ref 26.6–33)
MCHC RBC AUTO-ENTMCNC: 34.1 G/DL (ref 31.5–35.7)
MCV RBC AUTO: 107.7 FL (ref 79–97)
PLATELET # BLD AUTO: 343 10*3/MM3 (ref 140–450)
PMV BLD AUTO: 10.2 FL (ref 6–12)
POTASSIUM SERPL-SCNC: 3.2 MMOL/L (ref 3.5–5.2)
PROT SERPL-MCNC: 7.4 G/DL (ref 6–8.5)
RBC # BLD AUTO: 3.79 10*6/MM3 (ref 3.77–5.28)
SODIUM SERPL-SCNC: 138 MMOL/L (ref 136–145)
WBC NRBC COR # BLD: 5.87 10*3/MM3 (ref 3.4–10.8)

## 2022-09-13 PROCEDURE — 85027 COMPLETE CBC AUTOMATED: CPT

## 2022-09-13 PROCEDURE — 80053 COMPREHEN METABOLIC PANEL: CPT

## 2022-09-13 PROCEDURE — 80299 QUANTITATIVE ASSAY DRUG: CPT | Performed by: NURSE PRACTITIONER

## 2022-09-13 PROCEDURE — 36415 COLL VENOUS BLD VENIPUNCTURE: CPT | Performed by: NURSE PRACTITIONER

## 2022-09-13 PROCEDURE — 99214 OFFICE O/P EST MOD 30 MIN: CPT | Performed by: NURSE PRACTITIONER

## 2022-09-13 RX ORDER — MESALAMINE 1.2 G/1
1200 TABLET, DELAYED RELEASE ORAL
COMMUNITY
End: 2022-09-13 | Stop reason: SDUPTHER

## 2022-09-13 RX ORDER — MESALAMINE 1.2 G/1
1200 TABLET, DELAYED RELEASE ORAL
Qty: 90 TABLET | Refills: 3 | Status: SHIPPED | OUTPATIENT
Start: 2022-09-13

## 2022-09-13 RX ORDER — AZATHIOPRINE 50 MG/1
100 TABLET ORAL DAILY
Qty: 180 TABLET | Refills: 3 | Status: CANCELLED | OUTPATIENT
Start: 2022-09-13

## 2022-09-13 NOTE — PROGRESS NOTES
Chief Complaint   Patient presents with   • Med Refill     Needs meds refilled crohn's azathioprine and lialda qd       PCP: Junior Calvert MD  REFER: No ref. provider found      Subjective   HPI    Dulce Maria Bernal is a 52 y.o. female who presents with a history of Crohn's Disease.      She denies  abdominal cramping, diarrhea, bloody stool and anorexia.  Previous diagnostic test include  colonoscopy.  Maintained on Lialda and Imuran without difficulty.   Junior Calvert MD follows yearly lab work, last drawn Jan 2022.   Starting to experience fecal urgency over the past month, urgency is occurring multiple times on weekly basis.  She attributes this to stress.        PREVIOUS PROCEDURES:    COLONOSCOPY (06/05/2020 08:53)-normal (5 years)  COLONOSCOPY (05/25/2017 09:43)  SCANNED - COLONOSCOPY (06/13/2014 00:00)  SCANNED - COLONOSCOPY (07/01/2011 00:00)  SCANNED - COLONOSCOPY (08/08/2008 00:00)    Past Medical History:   Diagnosis Date   • Crohn's disease (HCC)    • Ulcerative colitis (HCC)    • Ulcerative proctitis (HCC)      Outpatient Medications Marked as Taking for the 9/13/22 encounter (Office Visit) with Harvinder Jeffers APRN   Medication Sig Dispense Refill   • azaTHIOprine (Imuran) 50 MG tablet Take 2 tablets by mouth Daily. 180 tablet 3   • mesalamine (LIALDA) 1.2 g EC tablet Take 1 tablet by mouth Daily With Breakfast. 90 tablet 3   • triamterene-hydrochlorothiazide (DYAZIDE) 37.5-25 MG per capsule TAKE ONE CAPSULE BY MOUTH EVERY DAY  5   • [DISCONTINUED] mesalamine (LIALDA) 1.2 g EC tablet Take 1,200 mg by mouth Daily With Breakfast.       No Known Allergies  Social History     Socioeconomic History   • Marital status:    Tobacco Use   • Smoking status: Never Smoker   • Smokeless tobacco: Never Used   Vaping Use   • Vaping Use: Never used   Substance and Sexual Activity   • Alcohol use: Yes     Comment: LITTLE BIT DAILY   • Drug use: No   • Sexual activity: Defer     Family History    Problem Relation Age of Onset   • Breast cancer Maternal Grandmother    • Crohn's disease Sister    • No Known Problems Mother    • No Known Problems Father    • No Known Problems Brother    • No Known Problems Daughter    • No Known Problems Son    • No Known Problems Paternal Grandmother    • No Known Problems Maternal Aunt    • No Known Problems Paternal Aunt    • Colon cancer Neg Hx    • Colon polyps Neg Hx    • Esophageal cancer Neg Hx    • Liver cancer Neg Hx    • Liver disease Neg Hx    • Rectal cancer Neg Hx    • Stomach cancer Neg Hx    • BRCA 1/2 Neg Hx    • Endometrial cancer Neg Hx    • Ovarian cancer Neg Hx      Review of Systems   Constitutional: Negative for unexpected weight change.   Respiratory: Negative for shortness of breath.    Cardiovascular: Negative for chest pain.   Gastrointestinal: Negative for abdominal pain and anal bleeding.     Objective   Vitals:    09/13/22 1235   BP: 126/70   Pulse: 74   Temp: 97 °F (36.1 °C)   SpO2: 100%     Physical Exam  Constitutional:       Appearance: Normal appearance. She is well-developed.   Eyes:      General: No scleral icterus.  Cardiovascular:      Rate and Rhythm: Regular rhythm.      Heart sounds: Normal heart sounds. No murmur heard.  Pulmonary:      Effort: Pulmonary effort is normal. No accessory muscle usage.      Breath sounds: Normal breath sounds.   Abdominal:      General: Bowel sounds are normal. There is no distension.      Palpations: Abdomen is soft. There is no mass.      Tenderness: There is no abdominal tenderness. There is no guarding or rebound.   Skin:     General: Skin is warm and dry.      Coloration: Skin is not jaundiced.   Neurological:      Mental Status: She is alert.   Psychiatric:         Behavior: Behavior is cooperative.       Imaging Results (Most Recent)     None        Body mass index is 26.4 kg/m².  Assessment & Plan   Diagnoses and all orders for this visit:    1. Crohn's disease without complication, unspecified  gastrointestinal tract location (HCC) (Primary)  -     Thiopurine Metabolites  -     CBC (No Diff); Future  -     Comprehensive Metabolic Panel; Future    2. Fecal urgency    Other orders  -     mesalamine (LIALDA) 1.2 g EC tablet; Take 1 tablet by mouth Daily With Breakfast.  Dispense: 90 tablet; Refill: 3          * Surgery not found *      Colonoscopy is up to date,   If symptoms persist may need to consider [facility]    Continue daily Lialda  May consider increasing Imuran dose pending finding on lab work  I will call Dulce Maria Bernal with results/recommendations    Harvinder Jeffers, RAVEN  09/13/22

## 2022-09-15 ENCOUNTER — TELEPHONE (OUTPATIENT)
Dept: GASTROENTEROLOGY | Facility: CLINIC | Age: 52
End: 2022-09-15

## 2022-09-15 NOTE — TELEPHONE ENCOUNTER
----- Message from RAVEN Mclaughlin sent at 9/14/2022  7:07 AM CDT -----  Please let Dulce Maria Bernal know her K is little low  I have faxed results to her PCP

## 2022-09-19 LAB
6-TGN ENTSUB RBC: 345 PMOL/8X 10E8
6MMP ENTSUB RBC: <190 PMOL/8X 10E8

## 2022-09-19 RX ORDER — AZATHIOPRINE 50 MG/1
100 TABLET ORAL DAILY
Qty: 180 TABLET | Refills: 3 | Status: SHIPPED | OUTPATIENT
Start: 2022-09-19

## 2022-10-03 ENCOUNTER — HOSPITAL ENCOUNTER (OUTPATIENT)
Dept: PREADMISSION TESTING | Age: 52
Discharge: HOME | End: 2022-10-07
Payer: COMMERCIAL

## 2022-10-03 ENCOUNTER — HOSPITAL ENCOUNTER (OUTPATIENT)
Dept: GENERAL RADIOLOGY | Age: 52
Discharge: HOME | End: 2022-10-03
Payer: COMMERCIAL

## 2022-10-03 VITALS — WEIGHT: 185 LBS | BODY MASS INDEX: 26.48 KG/M2 | HEIGHT: 70 IN

## 2022-10-03 LAB
ABO/RH: NORMAL
ALBUMIN SERPL-MCNC: 4.2 G/DL (ref 3.5–5.2)
ALP BLD-CCNC: 53 U/L (ref 35–104)
ALT SERPL-CCNC: 8 U/L (ref 5–33)
ANION GAP SERPL CALCULATED.3IONS-SCNC: 8 MMOL/L (ref 7–19)
ANTIBODY SCREEN: NORMAL
APTT: 25.1 SEC (ref 26–36.2)
AST SERPL-CCNC: 13 U/L (ref 5–32)
BACTERIA: NEGATIVE /HPF
BASOPHILS ABSOLUTE: 0 K/UL (ref 0–0.2)
BASOPHILS RELATIVE PERCENT: 0.5 % (ref 0–1)
BILIRUB SERPL-MCNC: 0.4 MG/DL (ref 0.2–1.2)
BILIRUBIN URINE: NEGATIVE
BLOOD, URINE: ABNORMAL
BUN BLDV-MCNC: 17 MG/DL (ref 6–20)
CALCIUM SERPL-MCNC: 8.9 MG/DL (ref 8.6–10)
CHLORIDE BLD-SCNC: 104 MMOL/L (ref 98–111)
CLARITY: CLEAR
CO2: 28 MMOL/L (ref 22–29)
COLOR: YELLOW
CREAT SERPL-MCNC: 0.8 MG/DL (ref 0.5–0.9)
CRYSTALS, UA: ABNORMAL /HPF
EKG P AXIS: 70 DEGREES
EKG P-R INTERVAL: 140 MS
EKG Q-T INTERVAL: 449 MS
EKG QRS DURATION: 92 MS
EKG QTC CALCULATION (BAZETT): 441 MS
EKG T AXIS: 40 DEGREES
EOSINOPHILS ABSOLUTE: 0.1 K/UL (ref 0–0.6)
EOSINOPHILS RELATIVE PERCENT: 1 % (ref 0–5)
EPITHELIAL CELLS, UA: 4 /HPF (ref 0–5)
GFR AFRICAN AMERICAN: >59
GFR NON-AFRICAN AMERICAN: >60
GLUCOSE BLD-MCNC: 88 MG/DL (ref 74–109)
GLUCOSE URINE: NEGATIVE MG/DL
HCT VFR BLD CALC: 41.5 % (ref 37–47)
HEMOGLOBIN: 13.8 G/DL (ref 12–16)
HYALINE CASTS: 4 /HPF (ref 0–8)
IMMATURE GRANULOCYTES #: 0 K/UL
INR BLD: 0.95 (ref 0.88–1.18)
KETONES, URINE: NEGATIVE MG/DL
LEUKOCYTE ESTERASE, URINE: ABNORMAL
LYMPHOCYTES ABSOLUTE: 0.9 K/UL (ref 1.1–4.5)
LYMPHOCYTES RELATIVE PERCENT: 15.9 % (ref 20–40)
MCH RBC QN AUTO: 36.2 PG (ref 27–31)
MCHC RBC AUTO-ENTMCNC: 33.3 G/DL (ref 33–37)
MCV RBC AUTO: 108.9 FL (ref 81–99)
MONOCYTES ABSOLUTE: 0.4 K/UL (ref 0–0.9)
MONOCYTES RELATIVE PERCENT: 7.1 % (ref 0–10)
MRSA SCREEN RT-PCR: NOT DETECTED
NEUTROPHILS ABSOLUTE: 4.4 K/UL (ref 1.5–7.5)
NEUTROPHILS RELATIVE PERCENT: 75.2 % (ref 50–65)
NITRITE, URINE: NEGATIVE
PDW BLD-RTO: 12.6 % (ref 11.5–14.5)
PH UA: 5.5 (ref 5–8)
PLATELET # BLD: 294 K/UL (ref 130–400)
PMV BLD AUTO: 10.7 FL (ref 9.4–12.3)
POTASSIUM SERPL-SCNC: 4 MMOL/L (ref 3.5–5)
PROTEIN UA: NEGATIVE MG/DL
PROTHROMBIN TIME: 12.5 SEC (ref 12–14.6)
RBC # BLD: 3.81 M/UL (ref 4.2–5.4)
RBC UA: 4 /HPF (ref 0–4)
SODIUM BLD-SCNC: 140 MMOL/L (ref 136–145)
SPECIFIC GRAVITY UA: 1.02 (ref 1–1.03)
TOTAL PROTEIN: 6.8 G/DL (ref 6.6–8.7)
UROBILINOGEN, URINE: 0.2 E.U./DL
WBC # BLD: 5.9 K/UL (ref 4.8–10.8)
WBC UA: 6 /HPF (ref 0–5)

## 2022-10-03 PROCEDURE — 86850 RBC ANTIBODY SCREEN: CPT

## 2022-10-03 PROCEDURE — 81001 URINALYSIS AUTO W/SCOPE: CPT

## 2022-10-03 PROCEDURE — 87641 MR-STAPH DNA AMP PROBE: CPT

## 2022-10-03 PROCEDURE — 71046 X-RAY EXAM CHEST 2 VIEWS: CPT

## 2022-10-03 PROCEDURE — 93010 ELECTROCARDIOGRAM REPORT: CPT | Performed by: INTERNAL MEDICINE

## 2022-10-03 PROCEDURE — 86901 BLOOD TYPING SEROLOGIC RH(D): CPT

## 2022-10-03 PROCEDURE — 93005 ELECTROCARDIOGRAM TRACING: CPT | Performed by: ORTHOPAEDIC SURGERY

## 2022-10-03 PROCEDURE — 85610 PROTHROMBIN TIME: CPT

## 2022-10-03 PROCEDURE — 80053 COMPREHEN METABOLIC PANEL: CPT

## 2022-10-03 PROCEDURE — 85025 COMPLETE CBC W/AUTO DIFF WBC: CPT

## 2022-10-03 PROCEDURE — 86900 BLOOD TYPING SEROLOGIC ABO: CPT

## 2022-10-03 PROCEDURE — 85730 THROMBOPLASTIN TIME PARTIAL: CPT

## 2022-10-03 RX ORDER — DEXAMETHASONE SODIUM PHOSPHATE 10 MG/ML
10 INJECTION, SOLUTION INTRAMUSCULAR; INTRAVENOUS ONCE
Status: CANCELLED | OUTPATIENT
Start: 2022-10-24

## 2022-10-03 RX ORDER — OXYCODONE HCL 10 MG/1
10 TABLET, FILM COATED, EXTENDED RELEASE ORAL ONCE
Status: CANCELLED | OUTPATIENT
Start: 2022-10-24

## 2022-10-03 RX ORDER — PREGABALIN 75 MG/1
75 CAPSULE ORAL ONCE
Status: CANCELLED | OUTPATIENT
Start: 2022-10-24

## 2022-10-03 RX ORDER — ACETAMINOPHEN 500 MG
1000 TABLET ORAL ONCE
Status: CANCELLED | OUTPATIENT
Start: 2022-10-24

## 2022-10-03 RX ORDER — CELECOXIB 200 MG/1
200 CAPSULE ORAL ONCE
Status: CANCELLED | OUTPATIENT
Start: 2022-10-24

## 2022-10-03 NOTE — DISCHARGE INSTRUCTIONS
[place] are [place] [facility] [place]th at 8:00 AM, for your COVID test.  Enter at the main level of the [facility], where the [employer] [place] is floating on water, go past the ball and when you come to the information board on the wall across from the elevators, you will see a door with a keypad. The sign next to it will read \"Staff Only\" and will have a red [place] on the sign. You will knock on that door and we will come get you for your test.  We will be expecting you. You must have your COVID test on this day or surgery will be canceled. After having your COVID 19 test, you will need to self isolate until the day of surgery. This means no public gatherings such as [employer]ism attendance, weddings, showers, funerals, etc.  If you need something from the store, you will need someone to pick it up for you. It is very important that you are not around other people prior to your surgery or you could contract COVID 19 between the time you are tested and your surgery date. Merrick Kim for the NARES    A script for Bactroban ointment has been call to your pharmacy or was given to you in written form by your surgeon. The guidelines for the ointment use are as follows:    1)  Start using the ointment 7 days before your surgery date    2)  Use the ointment two times a day - morning and night    3)  Place the ointment on a Q-tip and swirl up in your nose making sure you cover completely       the skin just inside of each nostril. Use one end of the Q-tip for each nostril. CHLORHEXIDINE GLUCONATE 4% SHOWERING    Patient should shower with this soap a minimum of 3 consecutive showers (2 nights before surgery, the night before surgery and the morning of surgery) washing from the neck down (avoiding contact with genitalia). DO NOT [address] YOUR HAIR OR FACE WITH THIS SOAP.   When washing with this soap, apply enough to suds up the body thoroughly, turn the water away from your body and allow the soap suds to remain on the body for 2 full minutes, then rinse body completely. After using this soap on the body, please do not apply powders or lotions to your body. After the shower the night before surgery, please dry off with a new towel, sleep in new freshly laundered pj's, and change your bed linen before going to sleep. The morning of surgery, you may take all your prescribed medications with a sip of water. Any exceptions to this would be listed below:           [facility] not eat or drink anything after midnight, the night before your surgery. This is extremely important for your safety. Take a bath (or shower) the night before your surgery and you may brush your teeth the morning of your surgery. You will be scheduled to arrive at the hospital 2 hours before your surgery, or follow your surgeon's instructions. Dress comfortably. Wear loose clothing that will be easy to remove and comfortable for your trip home. You may wear eyeglasses or contacts but bring your cases with you as they must be remove before your surgery. Hearing aids and dentures will need to be removed before your surgery. Do not wear any jewelry, including body jewelry. All jewelry will need to be removed prior to your surgery. Do not wear fingernail polish or make-up. It is best not to bring any valuables with you. If you are to stay in the hospital overnight, bring your robe, slippers and personal toiletries that you may need. POSTOPERATIVE GUIDELINES AFTER RECEIVING ANESTHESIA    If you are to go home after your surgery, you will need a responsible adult to drive you home. You will not be able to take public transportation after your discharge from the Operative Care Unit unless you are accompanied by a        responsible adult.     On returning home, be sure to follow your physician's orders regarding diet, activity and medications. Remember, surgery with general anesthesia or sedation may leave you sleepy, very tired and with a decreased appetite for 12 to 24 hours. If you develop any post-surgical complications or problems, call your surgeon or [facility] Emergency Department ([phone]). [address] [place] [address] for Surgery Patients-Revised 6-    Visitors for surgery patients are essential for the patient's emotional well-being and care       post operatively. 2.   Visitor Expectations and Limitations        VISITORS MUST WEAR MASKS AT ALL TIMES. NO Cloth masks allowed. 3.  One visitor allowed with patients in the preop/postop rooms. 4.  A second visitor may sit in the waiting area. 5.  No children under 13 allowed in the pre-post op areas unless they are the patient. 6.  Two people may be with an underage surgical/procedural patient in preop/postop        room. 7.  If you are admitted to the hospital post operatively, there are NO RESTRICTIONS on       the floor at this time. 8.  If you are admitted to ICU postoperatively, you may have one visitor in the room from        7A-7P. A second visitor may sit in the ICU waiting room.   There can be no overnight

## 2022-10-20 ENCOUNTER — HOSPITAL ENCOUNTER (OUTPATIENT)
Dept: PREADMISSION TESTING | Age: 52
Discharge: HOME | End: 2022-10-24
Payer: COMMERCIAL

## 2022-10-20 LAB — SARS-COV-2, PCR: NOT DETECTED

## 2022-10-20 PROCEDURE — U0003 INFECTIOUS AGENT DETECTION BY NUCLEIC ACID (DNA OR RNA); SEVERE ACUTE RESPIRATORY SYNDROME CORONAVIRUS 2 (SARS-COV-2) (CORONAVIRUS DISEASE [COVID-19]), AMPLIFIED PROBE TECHNIQUE, MAKING USE OF HIGH THROUGHPUT TECHNOLOGIES AS DESCRIBED BY CMS-2020-01-R: HCPCS

## 2022-10-20 PROCEDURE — U0005 INFEC AGEN DETEC AMPLI PROBE: HCPCS

## 2022-10-24 ENCOUNTER — HOSPITAL ENCOUNTER (OUTPATIENT)
Age: 52
Setting detail: OBSERVATION
LOS: 1 days | Discharge: HOME HEALTH | End: 2022-10-26
Attending: ORTHOPAEDIC SURGERY | Admitting: ORTHOPAEDIC SURGERY
Payer: COMMERCIAL

## 2022-10-24 ENCOUNTER — ANESTHESIA (OUTPATIENT)
Dept: OPERATING ROOM | Age: 52
End: 2022-10-24
Payer: COMMERCIAL

## 2022-10-24 ENCOUNTER — ANESTHESIA EVENT (OUTPATIENT)
Dept: OPERATING ROOM | Age: 52
End: 2022-10-24
Payer: COMMERCIAL

## 2022-10-24 ENCOUNTER — APPOINTMENT (OUTPATIENT)
Dept: GENERAL RADIOLOGY | Age: 52
End: 2022-10-24
Attending: ORTHOPAEDIC SURGERY
Payer: COMMERCIAL

## 2022-10-24 DIAGNOSIS — M16.11 PRIMARY OSTEOARTHRITIS OF RIGHT HIP: Primary | ICD-10-CM

## 2022-10-24 PROBLEM — M16.9 DEGENERATIVE JOINT DISEASE (DJD) OF HIP: Status: ACTIVE | Noted: 2022-10-24

## 2022-10-24 LAB
ABO/RH: NORMAL
ANTIBODY SCREEN: NORMAL
HCG(URINE) PREGNANCY TEST: NEGATIVE

## 2022-10-24 PROCEDURE — 6360000002 HC RX W HCPCS: Performed by: ORTHOPAEDIC SURGERY

## 2022-10-24 PROCEDURE — G0378 HOSPITAL OBSERVATION PER HR: HCPCS

## 2022-10-24 PROCEDURE — 6370000000 HC RX 637 (ALT 250 FOR IP): Performed by: ORTHOPAEDIC SURGERY

## 2022-10-24 PROCEDURE — 2709999900 HC NON-CHARGEABLE SUPPLY: Performed by: ORTHOPAEDIC SURGERY

## 2022-10-24 PROCEDURE — 2500000003 HC RX 250 WO HCPCS: Performed by: ORTHOPAEDIC SURGERY

## 2022-10-24 PROCEDURE — 86901 BLOOD TYPING SEROLOGIC RH(D): CPT

## 2022-10-24 PROCEDURE — 2500000003 HC RX 250 WO HCPCS: Performed by: ANESTHESIOLOGY

## 2022-10-24 PROCEDURE — 96372 THER/PROPH/DIAG INJ SC/IM: CPT

## 2022-10-24 PROCEDURE — 2580000003 HC RX 258: Performed by: ORTHOPAEDIC SURGERY

## 2022-10-24 PROCEDURE — 86900 BLOOD TYPING SEROLOGIC ABO: CPT

## 2022-10-24 PROCEDURE — 73502 X-RAY EXAM HIP UNI 2-3 VIEWS: CPT

## 2022-10-24 PROCEDURE — 6360000002 HC RX W HCPCS: Performed by: NURSE ANESTHETIST, CERTIFIED REGISTERED

## 2022-10-24 PROCEDURE — 84703 CHORIONIC GONADOTROPIN ASSAY: CPT

## 2022-10-24 PROCEDURE — 3700000000 HC ANESTHESIA ATTENDED CARE: Performed by: ORTHOPAEDIC SURGERY

## 2022-10-24 PROCEDURE — 88311 DECALCIFY TISSUE: CPT

## 2022-10-24 PROCEDURE — C9290 INJ, BUPIVACAINE LIPOSOME: HCPCS | Performed by: ORTHOPAEDIC SURGERY

## 2022-10-24 PROCEDURE — C1776 JOINT DEVICE (IMPLANTABLE): HCPCS | Performed by: ORTHOPAEDIC SURGERY

## 2022-10-24 PROCEDURE — 6370000000 HC RX 637 (ALT 250 FOR IP): Performed by: ANESTHESIOLOGY

## 2022-10-24 PROCEDURE — 3600000015 HC SURGERY LEVEL 5 ADDTL 15MIN: Performed by: ORTHOPAEDIC SURGERY

## 2022-10-24 PROCEDURE — 2720000010 HC SURG SUPPLY STERILE: Performed by: ORTHOPAEDIC SURGERY

## 2022-10-24 PROCEDURE — C1713 ANCHOR/SCREW BN/BN,TIS/BN: HCPCS | Performed by: ORTHOPAEDIC SURGERY

## 2022-10-24 PROCEDURE — 2500000003 HC RX 250 WO HCPCS: Performed by: NURSE ANESTHETIST, CERTIFIED REGISTERED

## 2022-10-24 PROCEDURE — 3600000005 HC SURGERY LEVEL 5 BASE: Performed by: ORTHOPAEDIC SURGERY

## 2022-10-24 PROCEDURE — 36415 COLL VENOUS BLD VENIPUNCTURE: CPT

## 2022-10-24 PROCEDURE — 86850 RBC ANTIBODY SCREEN: CPT

## 2022-10-24 PROCEDURE — 88304 TISSUE EXAM BY PATHOLOGIST: CPT

## 2022-10-24 PROCEDURE — 7100000000 HC PACU RECOVERY - FIRST 15 MIN: Performed by: ORTHOPAEDIC SURGERY

## 2022-10-24 PROCEDURE — 7100000001 HC PACU RECOVERY - ADDTL 15 MIN: Performed by: ORTHOPAEDIC SURGERY

## 2022-10-24 PROCEDURE — 3700000001 HC ADD 15 MINUTES (ANESTHESIA): Performed by: ORTHOPAEDIC SURGERY

## 2022-10-24 DEVICE — PINNACLE CANCELLOUS BONE SCREW 6.5MM X 30MM
Type: IMPLANTABLE DEVICE | Site: HIP | Status: FUNCTIONAL
Brand: PINNACLE

## 2022-10-24 DEVICE — PINNACLE HIP SOLUTIONS ALTRX POLYETHYLENE ACETABULAR LINER NEUTRAL 36MM ID 52MM OD
Type: IMPLANTABLE DEVICE | Site: HIP | Status: FUNCTIONAL
Brand: PINNACLE ALTRX

## 2022-10-24 DEVICE — BIOLOX DELTA CERAMIC FEMORAL HEAD +5.0 36MM DIA 12/14 TAPER
Type: IMPLANTABLE DEVICE | Site: HIP | Status: FUNCTIONAL
Brand: BIOLOX DELTA

## 2022-10-24 DEVICE — ACTIS DUOFIX HIP PROSTHESIS (FEMORAL STEM 12/14 TAPER CEMENTLESS SIZE 6 STD COLLAR)  CE
Type: IMPLANTABLE DEVICE | Site: HIP | Status: FUNCTIONAL
Brand: ACTIS

## 2022-10-24 DEVICE — CUP ACET DIA52MM 10 H HIP TI GRIPTION MULT H II VIP TAPR: Type: IMPLANTABLE DEVICE | Site: HIP | Status: FUNCTIONAL

## 2022-10-24 RX ORDER — NORETHINDRONE ACETATE AND ETHINYL ESTRADIOL, AND FERROUS FUMARATE 1MG-20(24)
1 KIT ORAL DAILY
Status: DISCONTINUED | OUTPATIENT
Start: 2022-10-24 | End: 2022-10-26 | Stop reason: HOSPADM

## 2022-10-24 RX ORDER — MESALAMINE 1.2 G/1
1.2 TABLET, DELAYED RELEASE ORAL
Status: DISCONTINUED | OUTPATIENT
Start: 2022-10-25 | End: 2022-10-26 | Stop reason: HOSPADM

## 2022-10-24 RX ORDER — SODIUM CHLORIDE 0.9 % (FLUSH) 0.9 %
5-40 SYRINGE (ML) INJECTION PRN
Status: DISCONTINUED | OUTPATIENT
Start: 2022-10-24 | End: 2022-10-26 | Stop reason: HOSPADM

## 2022-10-24 RX ORDER — SODIUM CHLORIDE 9 MG/ML
INJECTION, SOLUTION INTRAVENOUS CONTINUOUS
Status: DISCONTINUED | OUTPATIENT
Start: 2022-10-24 | End: 2022-10-26 | Stop reason: HOSPADM

## 2022-10-24 RX ORDER — BUPIVACAINE HYDROCHLORIDE 2.5 MG/ML
INJECTION, SOLUTION INFILTRATION; PERINEURAL PRN
Status: DISCONTINUED | OUTPATIENT
Start: 2022-10-24 | End: 2022-10-24 | Stop reason: ALTCHOICE

## 2022-10-24 RX ORDER — MESALAMINE 1.2 G/1
4800 TABLET, DELAYED RELEASE ORAL DAILY
Status: DISCONTINUED | OUTPATIENT
Start: 2022-10-24 | End: 2022-10-24 | Stop reason: CLARIF

## 2022-10-24 RX ORDER — LIDOCAINE HYDROCHLORIDE 10 MG/ML
INJECTION, SOLUTION EPIDURAL; INFILTRATION; INTRACAUDAL; PERINEURAL PRN
Status: DISCONTINUED | OUTPATIENT
Start: 2022-10-24 | End: 2022-10-24 | Stop reason: SDUPTHER

## 2022-10-24 RX ORDER — CLINDAMYCIN PHOSPHATE 900 MG/50ML
900 INJECTION INTRAVENOUS EVERY 8 HOURS
Status: COMPLETED | OUTPATIENT
Start: 2022-10-24 | End: 2022-10-26

## 2022-10-24 RX ORDER — CELECOXIB 200 MG/1
200 CAPSULE ORAL ONCE
Status: COMPLETED | OUTPATIENT
Start: 2022-10-24 | End: 2022-10-24

## 2022-10-24 RX ORDER — PREGABALIN 75 MG/1
75 CAPSULE ORAL ONCE
Status: COMPLETED | OUTPATIENT
Start: 2022-10-24 | End: 2022-10-24

## 2022-10-24 RX ORDER — OXYCODONE HYDROCHLORIDE 5 MG/1
5 TABLET ORAL EVERY 4 HOURS PRN
Status: DISCONTINUED | OUTPATIENT
Start: 2022-10-24 | End: 2022-10-26 | Stop reason: HOSPADM

## 2022-10-24 RX ORDER — SODIUM CHLORIDE 9 MG/ML
INJECTION, SOLUTION INTRAVENOUS PRN
Status: DISCONTINUED | OUTPATIENT
Start: 2022-10-24 | End: 2022-10-26 | Stop reason: HOSPADM

## 2022-10-24 RX ORDER — SODIUM CHLORIDE 0.9 % (FLUSH) 0.9 %
5-40 SYRINGE (ML) INJECTION EVERY 12 HOURS SCHEDULED
Status: DISCONTINUED | OUTPATIENT
Start: 2022-10-24 | End: 2022-10-26 | Stop reason: HOSPADM

## 2022-10-24 RX ORDER — AZATHIOPRINE 50 MG/1
100 TABLET ORAL DAILY
Status: DISCONTINUED | OUTPATIENT
Start: 2022-10-25 | End: 2022-10-26 | Stop reason: HOSPADM

## 2022-10-24 RX ORDER — ACETAMINOPHEN 325 MG/1
650 TABLET ORAL EVERY 6 HOURS
Status: DISCONTINUED | OUTPATIENT
Start: 2022-10-24 | End: 2022-10-26 | Stop reason: HOSPADM

## 2022-10-24 RX ORDER — POLYVINYL ALCOHOL 14 MG/ML
1 SOLUTION/ DROPS OPHTHALMIC PRN
Status: DISCONTINUED | OUTPATIENT
Start: 2022-10-24 | End: 2022-10-26 | Stop reason: HOSPADM

## 2022-10-24 RX ORDER — PROPOFOL 10 MG/ML
INJECTION, EMULSION INTRAVENOUS PRN
Status: DISCONTINUED | OUTPATIENT
Start: 2022-10-24 | End: 2022-10-24

## 2022-10-24 RX ORDER — 0.9 % SODIUM CHLORIDE 0.9 %
500 INTRAVENOUS SOLUTION INTRAVENOUS PRN
Status: DISCONTINUED | OUTPATIENT
Start: 2022-10-24 | End: 2022-10-26 | Stop reason: HOSPADM

## 2022-10-24 RX ORDER — ONDANSETRON 2 MG/ML
INJECTION INTRAMUSCULAR; INTRAVENOUS PRN
Status: DISCONTINUED | OUTPATIENT
Start: 2022-10-24 | End: 2022-10-24 | Stop reason: SDUPTHER

## 2022-10-24 RX ORDER — TRIAMTERENE AND HYDROCHLOROTHIAZIDE 37.5; 25 MG/1; MG/1
1 TABLET ORAL EVERY MORNING
Status: DISCONTINUED | OUTPATIENT
Start: 2022-10-24 | End: 2022-10-26 | Stop reason: HOSPADM

## 2022-10-24 RX ORDER — EPHEDRINE SULFATE 50 MG/ML
INJECTION, SOLUTION INTRAVENOUS PRN
Status: DISCONTINUED | OUTPATIENT
Start: 2022-10-24 | End: 2022-10-24 | Stop reason: SDUPTHER

## 2022-10-24 RX ORDER — OXYCODONE HYDROCHLORIDE 5 MG/1
10 TABLET ORAL EVERY 4 HOURS PRN
Status: DISCONTINUED | OUTPATIENT
Start: 2022-10-24 | End: 2022-10-26 | Stop reason: HOSPADM

## 2022-10-24 RX ORDER — ONDANSETRON 2 MG/ML
4 INJECTION INTRAMUSCULAR; INTRAVENOUS EVERY 6 HOURS PRN
Status: DISCONTINUED | OUTPATIENT
Start: 2022-10-24 | End: 2022-10-26 | Stop reason: HOSPADM

## 2022-10-24 RX ORDER — SODIUM CHLORIDE, SODIUM LACTATE, POTASSIUM CHLORIDE, CALCIUM CHLORIDE 600; 310; 30; 20 MG/100ML; MG/100ML; MG/100ML; MG/100ML
INJECTION, SOLUTION INTRAVENOUS CONTINUOUS
Status: DISCONTINUED | OUTPATIENT
Start: 2022-10-24 | End: 2022-10-24 | Stop reason: HOSPADM

## 2022-10-24 RX ORDER — PROPOFOL 10 MG/ML
INJECTION, EMULSION INTRAVENOUS CONTINUOUS PRN
Status: DISCONTINUED | OUTPATIENT
Start: 2022-10-24 | End: 2022-10-24 | Stop reason: SDUPTHER

## 2022-10-24 RX ORDER — ONDANSETRON 4 MG/1
4 TABLET, ORALLY DISINTEGRATING ORAL EVERY 8 HOURS PRN
Status: DISCONTINUED | OUTPATIENT
Start: 2022-10-24 | End: 2022-10-26 | Stop reason: HOSPADM

## 2022-10-24 RX ORDER — LORAZEPAM 1 MG/1
1 TABLET ORAL EVERY 6 HOURS PRN
Status: DISCONTINUED | OUTPATIENT
Start: 2022-10-24 | End: 2022-10-26 | Stop reason: HOSPADM

## 2022-10-24 RX ORDER — TRANEXAMIC ACID 100 MG/ML
INJECTION, SOLUTION INTRAVENOUS PRN
Status: DISCONTINUED | OUTPATIENT
Start: 2022-10-24 | End: 2022-10-24 | Stop reason: SDUPTHER

## 2022-10-24 RX ORDER — TRAMADOL HYDROCHLORIDE 50 MG/1
100 TABLET ORAL EVERY 6 HOURS
Status: DISCONTINUED | OUTPATIENT
Start: 2022-10-24 | End: 2022-10-26 | Stop reason: HOSPADM

## 2022-10-24 RX ORDER — HYDROMORPHONE HYDROCHLORIDE 1 MG/ML
0.25 INJECTION, SOLUTION INTRAMUSCULAR; INTRAVENOUS; SUBCUTANEOUS
Status: DISCONTINUED | OUTPATIENT
Start: 2022-10-24 | End: 2022-10-26 | Stop reason: HOSPADM

## 2022-10-24 RX ORDER — OXYCODONE HCL 10 MG/1
10 TABLET, FILM COATED, EXTENDED RELEASE ORAL ONCE
Status: COMPLETED | OUTPATIENT
Start: 2022-10-24 | End: 2022-10-24

## 2022-10-24 RX ORDER — MIDAZOLAM HYDROCHLORIDE 1 MG/ML
INJECTION INTRAMUSCULAR; INTRAVENOUS
Status: COMPLETED
Start: 2022-10-24 | End: 2022-10-24

## 2022-10-24 RX ORDER — HYDROMORPHONE HYDROCHLORIDE 1 MG/ML
1 INJECTION, SOLUTION INTRAMUSCULAR; INTRAVENOUS; SUBCUTANEOUS
Status: DISCONTINUED | OUTPATIENT
Start: 2022-10-24 | End: 2022-10-26 | Stop reason: HOSPADM

## 2022-10-24 RX ORDER — PROPOFOL 10 MG/ML
INJECTION, EMULSION INTRAVENOUS PRN
Status: DISCONTINUED | OUTPATIENT
Start: 2022-10-24 | End: 2022-10-24 | Stop reason: SDUPTHER

## 2022-10-24 RX ORDER — MIDAZOLAM HYDROCHLORIDE 1 MG/ML
INJECTION INTRAMUSCULAR; INTRAVENOUS PRN
Status: DISCONTINUED | OUTPATIENT
Start: 2022-10-24 | End: 2022-10-24 | Stop reason: SDUPTHER

## 2022-10-24 RX ORDER — HYDROMORPHONE HYDROCHLORIDE 1 MG/ML
0.5 INJECTION, SOLUTION INTRAMUSCULAR; INTRAVENOUS; SUBCUTANEOUS
Status: DISCONTINUED | OUTPATIENT
Start: 2022-10-24 | End: 2022-10-26 | Stop reason: HOSPADM

## 2022-10-24 RX ORDER — OXYCODONE HYDROCHLORIDE 5 MG/1
15 TABLET ORAL EVERY 4 HOURS PRN
Status: DISCONTINUED | OUTPATIENT
Start: 2022-10-24 | End: 2022-10-26 | Stop reason: HOSPADM

## 2022-10-24 RX ORDER — MESALAMINE 400 MG/1
1600 CAPSULE, DELAYED RELEASE ORAL 3 TIMES DAILY
Status: DISCONTINUED | OUTPATIENT
Start: 2022-10-24 | End: 2022-10-24

## 2022-10-24 RX ORDER — DEXAMETHASONE SODIUM PHOSPHATE 10 MG/ML
10 INJECTION, SOLUTION INTRAMUSCULAR; INTRAVENOUS ONCE
Status: COMPLETED | OUTPATIENT
Start: 2022-10-24 | End: 2022-10-24

## 2022-10-24 RX ORDER — ACETAMINOPHEN 500 MG
1000 TABLET ORAL ONCE
Status: COMPLETED | OUTPATIENT
Start: 2022-10-24 | End: 2022-10-24

## 2022-10-24 RX ADMIN — SODIUM CHLORIDE, SODIUM LACTATE, POTASSIUM CHLORIDE, AND CALCIUM CHLORIDE: 600; 310; 30; 20 INJECTION, SOLUTION INTRAVENOUS at 07:32

## 2022-10-24 RX ADMIN — PROPOFOL 50 MG: 10 INJECTION, EMULSION INTRAVENOUS at 08:31

## 2022-10-24 RX ADMIN — TRANEXAMIC ACID 1000 MG: 1 INJECTION, SOLUTION INTRAVENOUS at 08:27

## 2022-10-24 RX ADMIN — SODIUM CHLORIDE, PRESERVATIVE FREE 10 ML: 5 INJECTION INTRAVENOUS at 11:54

## 2022-10-24 RX ADMIN — ONDANSETRON 4 MG: 2 INJECTION INTRAMUSCULAR; INTRAVENOUS at 08:13

## 2022-10-24 RX ADMIN — CELECOXIB 200 MG: 200 CAPSULE ORAL at 07:32

## 2022-10-24 RX ADMIN — POLYVINYL ALCOHOL 1 DROP: 14 SOLUTION/ DROPS OPHTHALMIC at 17:27

## 2022-10-24 RX ADMIN — EPHEDRINE SULFATE 10 MG: 50 INJECTION INTRAMUSCULAR; INTRAVENOUS; SUBCUTANEOUS at 09:17

## 2022-10-24 RX ADMIN — SODIUM CHLORIDE: 9 INJECTION, SOLUTION INTRAVENOUS at 11:50

## 2022-10-24 RX ADMIN — ACETAMINOPHEN 650 MG: 325 TABLET, FILM COATED ORAL at 13:54

## 2022-10-24 RX ADMIN — OXYCODONE 10 MG: 5 TABLET ORAL at 13:59

## 2022-10-24 RX ADMIN — OXYCODONE 10 MG: 5 TABLET ORAL at 19:35

## 2022-10-24 RX ADMIN — TRAMADOL HYDROCHLORIDE 100 MG: 50 TABLET, COATED ORAL at 23:49

## 2022-10-24 RX ADMIN — EPHEDRINE SULFATE 10 MG: 50 INJECTION INTRAMUSCULAR; INTRAVENOUS; SUBCUTANEOUS at 09:37

## 2022-10-24 RX ADMIN — TRAMADOL HYDROCHLORIDE 100 MG: 50 TABLET, COATED ORAL at 17:22

## 2022-10-24 RX ADMIN — SODIUM CHLORIDE, SODIUM LACTATE, POTASSIUM CHLORIDE, AND CALCIUM CHLORIDE: 600; 310; 30; 20 INJECTION, SOLUTION INTRAVENOUS at 09:40

## 2022-10-24 RX ADMIN — LIDOCAINE HYDROCHLORIDE 20 MG: 10 INJECTION, SOLUTION EPIDURAL; INFILTRATION; INTRACAUDAL; PERINEURAL at 08:16

## 2022-10-24 RX ADMIN — CLINDAMYCIN PHOSPHATE 900 MG: 900 INJECTION, SOLUTION INTRAVENOUS at 19:36

## 2022-10-24 RX ADMIN — PROPOFOL 70 MG: 10 INJECTION, EMULSION INTRAVENOUS at 08:21

## 2022-10-24 RX ADMIN — LIDOCAINE HYDROCHLORIDE 50 MG: 10 INJECTION, SOLUTION EPIDURAL; INFILTRATION; INTRACAUDAL; PERINEURAL at 08:21

## 2022-10-24 RX ADMIN — ACETAMINOPHEN 650 MG: 325 TABLET, FILM COATED ORAL at 19:35

## 2022-10-24 RX ADMIN — RIVAROXABAN 10 MG: 10 TABLET, FILM COATED ORAL at 17:22

## 2022-10-24 RX ADMIN — PROPOFOL 120 MCG/KG/MIN: 10 INJECTION, EMULSION INTRAVENOUS at 08:21

## 2022-10-24 RX ADMIN — CEFAZOLIN 2000 MG: 2 INJECTION, POWDER, FOR SOLUTION INTRAMUSCULAR; INTRAVENOUS at 15:51

## 2022-10-24 RX ADMIN — BISACODYL 5 MG: 5 TABLET, COATED ORAL at 11:53

## 2022-10-24 RX ADMIN — PREGABALIN 75 MG: 75 CAPSULE ORAL at 07:32

## 2022-10-24 RX ADMIN — TRAMADOL HYDROCHLORIDE 100 MG: 50 TABLET, COATED ORAL at 11:53

## 2022-10-24 RX ADMIN — DEXAMETHASONE SODIUM PHOSPHATE 10 MG: 10 INJECTION, SOLUTION INTRAMUSCULAR; INTRAVENOUS at 08:31

## 2022-10-24 RX ADMIN — ACETAMINOPHEN 1000 MG: 500 TABLET ORAL at 07:32

## 2022-10-24 RX ADMIN — CLINDAMYCIN PHOSPHATE 900 MG: 900 INJECTION, SOLUTION INTRAVENOUS at 11:52

## 2022-10-24 RX ADMIN — MIDAZOLAM 2 MG: 1 INJECTION INTRAMUSCULAR; INTRAVENOUS at 08:12

## 2022-10-24 RX ADMIN — CEFAZOLIN 2000 MG: 2 INJECTION, POWDER, FOR SOLUTION INTRAMUSCULAR; INTRAVENOUS at 08:23

## 2022-10-24 RX ADMIN — OXYCODONE HYDROCHLORIDE 10 MG: 10 TABLET, FILM COATED, EXTENDED RELEASE ORAL at 07:32

## 2022-10-24 ASSESSMENT — PAIN SCALES - GENERAL
PAINLEVEL_OUTOF10: 2
PAINLEVEL_OUTOF10: 0
PAINLEVEL_OUTOF10: 4
PAINLEVEL_OUTOF10: 5
PAINLEVEL_OUTOF10: 1

## 2022-10-24 ASSESSMENT — PAIN DESCRIPTION - PAIN TYPE
TYPE: SURGICAL PAIN
TYPE: SURGICAL PAIN

## 2022-10-24 ASSESSMENT — PAIN DESCRIPTION - ORIENTATION
ORIENTATION: RIGHT

## 2022-10-24 ASSESSMENT — PAIN DESCRIPTION - DESCRIPTORS
DESCRIPTORS: ACHING

## 2022-10-24 ASSESSMENT — PAIN DESCRIPTION - LOCATION
LOCATION: HIP

## 2022-10-24 ASSESSMENT — PAIN DESCRIPTION - FREQUENCY
FREQUENCY: CONTINUOUS
FREQUENCY: CONTINUOUS

## 2022-10-24 ASSESSMENT — PAIN - FUNCTIONAL ASSESSMENT
PAIN_FUNCTIONAL_ASSESSMENT: PREVENTS OR INTERFERES SOME ACTIVE ACTIVITIES AND ADLS
PAIN_FUNCTIONAL_ASSESSMENT: PREVENTS OR INTERFERES SOME ACTIVE ACTIVITIES AND ADLS

## 2022-10-24 ASSESSMENT — PAIN DESCRIPTION - ONSET
ONSET: ON-GOING
ONSET: ON-GOING

## 2022-10-24 NOTE — ANESTHESIA PRE PROCEDURE
Department of Anesthesiology  Preprocedure Note       Name:  Maged Paredes   Age:  47 y.o.  :  1970                                          MRN:  [id number]         Date:  10/24/2022      Surgeon: Laura Miller):  Shannon Dubon MD    Procedure: Procedure(s):  RIGHT L5-S1 MICRODISCECTOMY     Medications prior to admission:   Prior to Admission medications    Medication Sig Start Date End Date Taking? Authorizing Provider   Norethin Ace-Eth Estrad-FE 1-20 MG-MCG(24) CAPS TAKE 1 CAPSULE BY MOUTH EVERY DAY  Patient taking differently: Take 1 capsule by mouth daily TAKE 1 CAPSULE BY MOUTH EVERY DAY 22   Sarabia Mast, APRN - CNP   LORazepam (ATIVAN) 1 MG tablet Take 1 mg by mouth every 6 hours as needed for Anxiety. Historical Provider, MD   triamterene-hydrochlorothiazide (DYAZIDE) 37.5-25 MG per capsule Take 1 capsule by mouth every morning    Historical Provider, MD   mesalamine (LIALDA) 1.2 G EC tablet Take 4,800 mg by mouth daily    Historical Provider, MD   azaTHIOprine 100 MG TABS Take 100 mg by mouth daily    Historical Provider, MD       Current medications:    No current facility-administered medications for this visit. No current outpatient medications on file.      Facility-Administered Medications Ordered in Other Visits   Medication Dose Route Frequency Provider Last Rate Last Admin    lactated ringers infusion   IntraVENous Continuous Shannon Dubon MD        ceFAZolin (ANCEF) 2,000 mg in sterile water 20 mL IV syringe  2,000 mg IntraVENous Once Shannon Dubon MD        pregabalin (LYRICA) capsule 75 mg  75 mg Oral Once Shannon Dubon MD        celecoxib (CELEBREX) capsule 200 mg  200 mg Oral Once Shannon Dubon MD        oxyCODONE [place]) extended release tablet 10 mg  10 mg Oral Once Shannon Dubon MD        acetaminophen (TYLENOL) tablet 1,000 mg  1,000 mg Oral Once Shannon Dubon MD        dexamethasone (PF) (DECADRON) injection 10 mg  10 mg IntraVENous Once Nayla  Meri Tucker MD           Allergies:  No Known Allergies    Problem List:    Patient Active Problem List   Diagnosis Code    Lumbar radiculopathy, right M54.16    Connective tissue and disc stenosis of intervertebral foramina of lumbar region M99.73       Past Medical History:        Diagnosis Date    Back disorder     bulging disk    Colitis     Connective tissue and disc stenosis of intervertebral foramina of lumbar region 2016    Hypertension     Localized swelling of both lower legs     Lumbar radiculopathy, right 2016       Past Surgical History:        Procedure Laterality Date    BREAST ENHANCEMENT SURGERY       SECTION      COLONOSCOPY      2020    DILATION AND CURETTAGE OF UTERUS      LUMBAR SPINE SURGERY Right 2016    RIGHT L5-S1 MICRODISCECTOMY  performed by Addy Patton MD at [facility] MICRODISCECTOMY LUMBAR      TOTAL HIP ARTHROPLASTY Left        Social History:    Social History     Tobacco Use    Smoking status: Never    Smokeless tobacco: Never   Substance Use Topics    Alcohol use: Yes     Alcohol/week: 2.0 standard drinks     Types: 2 Glasses of wine per week     Comment: daily                                Counseling given: Not Answered      Vital Signs (Current): There were no vitals filed for this visit.                                            BP Readings from Last 3 Encounters:   22 136/89   21 120/78   20 122/89       NPO Status:                                                                                 BMI:   Wt Readings from Last 3 Encounters:   10/03/22 185 lb (83.9 kg)   22 175 lb (79.4 kg)   21 185 lb (83.9 kg)     There is no height or weight on file to calculate BMI.    CBC:   Lab Results   Component Value Date/Time    WBC 5.9 10/03/2022 09:08 AM    RBC 3.81 10/03/2022 09:08 AM    HGB 13.8 10/03/2022 09:08 AM    HCT 41.5 10/03/2022 09:08 AM    HCT 38.6 2012 01:51 PM    .9 10/03/2022 09:08 AM RDW 12.6 10/03/2022 09:08 AM     10/03/2022 09:08 AM     03/22/2012 01:51 PM       CMP:   Lab Results   Component Value Date/Time     10/03/2022 09:08 AM    K 4.0 10/03/2022 09:08 AM    K 3.9 01/13/2012 09:04 AM     10/03/2022 09:08 AM    CO2 28 10/03/2022 09:08 AM    BUN 17 10/03/2022 09:08 AM    CREATININE 0.8 10/03/2022 09:08 AM    GFRAA >59 10/03/2022 09:08 AM    LABGLOM >60 10/03/2022 09:08 AM    GLUCOSE 88 10/03/2022 09:08 AM    PROT 6.8 10/03/2022 09:08 AM    CALCIUM 8.9 10/03/2022 09:08 AM    BILITOT 0.4 10/03/2022 09:08 AM    ALKPHOS 53 10/03/2022 09:08 AM    AST 13 10/03/2022 09:08 AM    ALT 8 10/03/2022 09:08 AM       POC Tests: No results for input(s): POCGLU, POCNA, POCK, POCCL, POCBUN, POCHEMO, POCHCT in the last 72 hours. Coags:   Lab Results   Component Value Date/Time    PROTIME 12.5 10/03/2022 09:08 AM    INR 0.95 10/03/2022 09:08 AM    APTT 25.1 10/03/2022 09:08 AM       HCG (If Applicable):   Lab Results   Component Value Date    PREGTESTUR Negative 11/16/2016        ABGs: No results found for: PHART, PO2ART, PTB0IOZ, WNW9IBD, BEART, R3MWWATC     Type & Screen (If Applicable):  No results found for: LABABO, [address]    Anesthesia Evaluation  Patient summary reviewed and Nursing notes reviewed  Airway: Mallampati: I  TM distance: >3 FB   Neck ROM: full  Mouth opening: > = 3 FB   Dental: normal exam         Pulmonary:Negative Pulmonary ROS and normal exam                               Cardiovascular:Negative CV ROS    (+) hypertension:,       ECG reviewed               Beta Blocker:  Not on Beta Blocker         Neuro/Psych:   Negative Neuro/Psych ROS  (+) depression/anxiety             GI/Hepatic/Renal:        (-) GERD, liver disease and no renal disease      ROS comment: colitis. Endo/Other:    (+) : arthritis:., .                 Abdominal:             Vascular:           Other Findings:             Anesthesia Plan      spinal     ASA 2       Induction: intravenous. MIPS: Postoperative opioids intended and Prophylactic antiemetics administered. Anesthetic plan and risks discussed with patient and spouse. Plan discussed with CRNA.                     Joseph Estrada MD   10/24/2022

## 2022-10-24 NOTE — PROGRESS NOTES
4 Eyes Skin Assessment    Alen Acevedo is being assessed upon: Admission    I agree that I, Rika Aguilera RN, along with Luke Quinonez RN have performed a thorough Head to Toe Skin Assessment on the patient. ALL assessment sites listed below have been assessed. Areas assessed by both nurses:     [x]   Head, Face, and Ears   [x]   Shoulders, Back, and Chest  [x]   Arms, Elbows, and Hands   [x]   Coccyx, Sacrum, and Ischium  [x]   Legs, Feet, and Heels    Does the Patient have Skin Breakdown?  No    Miguel Prevention initiated: No  Wound Care Orders initiated: No    [facility] nurse consulted for Pressure Injury (Stage 3,4, Unstageable, DTI, NWPT, and Complex wounds) and New or Established Ostomies: No        Primary Nurse eSignature: Rika Aguilera RN on 10/24/2022 at 11:20 AM      Co-Signer eSignature: Electronically signed by Luke Quinonez RN on 10/24/22 at 12:15 PM CDT

## 2022-10-24 NOTE — BRIEF OP NOTE
Brief Postoperative Note      Patient: Emir Crain  YOB: 1970  MRN: [id number]    Date of Procedure: 10/24/2022    Pre-Op Diagnosis: Primary osteoarthritis of right hip [M16.11]    Post-Op Diagnosis: Same       Procedure(s):  RIGHT TOTAL HIP REPLACEMENT    Surgeon(s):  Ana Cristina Juan MD    Assistant:  Surgical Assistant: Nneka Hale Assistant: Machelle Candelaria PA-C    Anesthesia: Spinal    Estimated Blood Loss (mL): 858    Complications: None    Specimens:   ID Type Source Tests Collected by Time Destination   A : RIGHT FEMORAL HEAD  Bone Joint, Hip SURGICAL PATHOLOGY Ana Cristina Juan MD 10/24/2022 2373        Implants:  Implant Name Type Inv.  Item Serial No.  Lot No. LRB No. Used Action   CUP ACET [id number] 10 H HIP TI Marveen Alanis - [id number]  CUP ACET [id number] 10 H HIP TI Makayla Comp II VIP TAPR  [facility] [employer] [facility] [id number] Right 1 Implanted   SCREW BNE L30MM DIA6.5MM CANC HIP Ritta Gaw THRD - [id number]  SCREW BNE L30MM DIA6.5MM CANC HIP S STL GRIPTION FULL THRD  [facility] [employer] [id number] Right 1 Implanted   LINER ACET OD52MM ID36MM HIP ALTRX PINN - [id number]  LINER ACET OD52MM ID36MM HIP ALTRX PINN  [facility] [employer] [id number] Right 1 Implanted   STEM FEM SZ 6 L107MM 12/14 TAPR STD OFFSET HIP DUOFIX [facility] - [id number]  STEM FEM SZ 6 L107MM 12/14 TAPR STD OFFSET HIP DUOFIX CLLRD  [facility] [employer] [id number] Right 1 Implanted   HEAD FEM [id number] +5MM OFFSET 12/14 TAPR HIP CERAMIC BIOLOX - [id number]  HEAD FEM [id number] +5MM OFFSET 12/14 TAPR HIP CERAMIC BIOLOX  [facility] [employer] [id number] Right 1 Implanted         Drains:   Urinary Catheter 10/24/22 Jaclyn (Active)       Findings:     Electronically signed by Ana Cristina Juan MD on 10/24/2022 at 10:00 AM

## 2022-10-24 NOTE — H&P
vertigo  Resp: Denies any shortness of breath, cough or wheezing  Cardiac: Denies any chest pain, palpitations, claudication or edema  GI: Denies any melena, hematochezia, hematemesis or pyrosis  : Denies any frequency, urgency, hesitancy or incontinence  Musculoskeletal: Denies back pain, joint pain, myalgias  Heme: Denies bruising or bleeding easily  Endocrine: Denies any history of diabetes or thyroid disease  Psych: Denies anxiety or depression  Neuro: Denies any focal motor or sensory deficits    NEUROLOGIC: CN II-XI grossly intact, no motor or sensory deficits   PSYCH: mood and affect are normal with a normal rate and tone of speech    Physical Exam:  Vitals: LMP 10/15/2022   CONSTITUTIONAL: Alert, appropriate, no acute distress. EYES: Non icteric, EOM intact, pupils equal round and reactive to light  ENT: Mucus membranes moist, no oral pharyngeal lesions, nares patent   NECK: Supple, no masses, no JVD, trachea mid line   CHEST/LUNGS: CTA bilaterally, normal respiratory effort   CARDIOVASCULAR: RRR, no murmurs,  2+ DP and radial pulses bilaterally  ABDOMEN: soft, nontender  EXTREMITIES: warm, well perfused, no edema   SKIN: warm, dry with no rashes or lesions  LYMPH: No cervical or inguinal lymphadenopathy    General Appearance: Patient is well nourished, well developed    HEENT: Normal    CARDIOVASCULAR: Normal S1 and S2.  Regular rhythm. No murmurs, gallops, or rubs. PULMONARY: Normal.    GASTROINTESTINAL: Soft, non-tender, normal bowel sounds. No bruits, organomegaly or masses.     EXTREMITIES: positive exam findings: lateral joint line tenderness, tender over tibial tubercle, ecchymosis noted entire limb and ROM limited to approximately 80 degrees    MUSCULOSKELETAL: Tenderness over right hip(s)    NEUROLOGICAL: speech normal    Diagnostic Studies:      Labs: CBC with Differential:    Lab Results   Component Value Date/Time    WBC 5.9 10/03/2022 09:08 AM    RBC 3.81 10/03/2022 09:08 AM    HGB 13.8 10/03/2022 09:08 AM    HCT 41.5 10/03/2022 09:08 AM    HCT 38.6 03/22/2012 01:51 PM     10/03/2022 09:08 AM     03/22/2012 01:51 PM    .9 10/03/2022 09:08 AM    MCH 36.2 10/03/2022 09:08 AM    MCHC 33.3 10/03/2022 09:08 AM    RDW 12.6 10/03/2022 09:08 AM    LYMPHOPCT 15.9 10/03/2022 09:08 AM    MONOPCT 7.1 10/03/2022 09:08 AM    EOSPCT 0.5 03/22/2012 01:51 PM    BASOPCT 0.5 10/03/2022 09:08 AM    MONOSABS 0.40 10/03/2022 09:08 AM    LYMPHSABS 0.9 10/03/2022 09:08 AM    EOSABS 0.10 10/03/2022 09:08 AM    BASOSABS 0.00 10/03/2022 09:08 AM     BMP:    Lab Results   Component Value Date/Time     10/03/2022 09:08 AM    K 4.0 10/03/2022 09:08 AM    K 3.9 01/13/2012 09:04 AM     10/03/2022 09:08 AM    CO2 28 10/03/2022 09:08 AM    BUN 17 10/03/2022 09:08 AM    LABALBU 4.2 10/03/2022 09:08 AM    CREATININE 0.8 10/03/2022 09:08 AM    CALCIUM 8.9 10/03/2022 09:08 AM    GFRAA >59 10/03/2022 09:08 AM    LABGLOM >60 10/03/2022 09:08 AM    GLUCOSE 88 10/03/2022 09:08 AM     Sodium:    Lab Results   Component Value Date/Time     10/03/2022 09:08 AM     Potassium:    Lab Results   Component Value Date/Time    K 4.0 10/03/2022 09:08 AM    K 3.9 01/13/2012 09:04 AM     BUN/Creatinine:    Lab Results   Component Value Date/Time    BUN 17 10/03/2022 09:08 AM    CREATININE 0.8 10/03/2022 09:08 AM     PT/INR:    Lab Results   Component Value Date/Time    PROTIME 12.5 10/03/2022 09:08 AM    INR 0.95 10/03/2022 09:08 AM     PTT:    Lab Results   Component Value Date/Time    APTT 25.1 10/03/2022 09:08 AM    PTT 29.7 06/23/2015 03:00 PM   [APTT}  U/A:    Lab Results   Component Value Date/Time    COLORU YELLOW 10/03/2022 09:20 AM    PHUR 5.5 10/03/2022 09:20 AM    WBCUA 6 10/03/2022 09:20 AM    RBCUA 4 10/03/2022 09:20 AM    BACTERIA NEGATIVE 10/03/2022 09:20 AM    CLARITYU Clear 10/03/2022 09:20 AM    SPECGRAV 1.024 10/03/2022 09:20 AM    LEUKOCYTESUR TRACE 10/03/2022 09:20 AM    UROBILINOGEN 0.2 10/03/2022 09:20 AM    BILIRUBINUR Negative 10/03/2022 09:20 AM    BLOODU MODERATE 10/03/2022 09:20 AM    GLUCOSEU Negative 10/03/2022 09:20 AM     HgBA1c:  No components found for: HGBA1C        PLAN:  R IVORY      Electronically signed by Wilmer Coronel MD on 10/24/2022 at 7:04 AM

## 2022-10-24 NOTE — OP NOTE
[facility] MAGGY [address], [address] [facility]                                OPERATIVE REPORT    PATIENT NAME: Emilia Aggarwal                 :        1970  MED REC NO:   [id number]                              ROOM:       [facility]  ACCOUNT NO:   [de-identified]                           ADMIT DATE: 10/24/2022  PROVIDER:     Sandra Acevedo MD    DATE OF PROCEDURE:  10/24/2022    PREOPERATIVE DIAGNOSES:  1. Primary osteoarthritis, right hip. 2.  Clinical leg length discrepancy of right leg longer than left leg by  approximately 12 mm, radiographically right shorter than left by about  10 mm. POSTOPERATIVE DIAGNOSES:  1. Primary osteoarthritis, right hip. 2.  Clinical leg length discrepancy of right leg longer than left leg by  approximately 12 mm, radiographically right shorter than left by about  10 mm. PROCEDURES:  1. Right total hip arthroplasty. 2.  Use of computer navigation for assessment of leg length and  component placement, code 0054T. SURGEON:  Sandra Acevedo MD    FIRST ASSISTANT:  Mey Lam PA-C. Please note, he is a critical  assistant in exposure and placement of implants. ANESTHESIA:  Spinal.    EBL:  350 mL. FLUIDS:  1200 mL of crystalloid. URINE OUTPUT:  150 mL. COMPONENTS USED:  DePuy hip system, acetabular shell size 52 mm GRIPTION  PINNACLE shell with a 30 mm screw, 36 liner, stem is a size 6 standard  offset ACTIS stem, head is a 36 mm +5 ceramic head. SPECIMENS:  Right femoral head placed in formalin for pathology. INDICATIONS:  This is a 76-year-old lady with severe arthritis of the  right hip, failing conservative care. Because of this, she elected for  the above procedure. OPERATIVE PROCEDURE:  After informed consent, she was given 2 gm of  Ancef, 1 gm of tranexamic acid, underwent spinal anesthesia.   Her leg  lengths again on bench examination when she was asleep, her right leg  was clearly longer than her left leg. She was told that we would not  significantly change her leg lengths, but actually shortened the leg as  radiographically she is still actually 10 mm short of right compared to  left on x-ray. She understands this and the goals of the surgery. She  was placed on the [facility] table. Anaya catheter was inserted. A combined  20-degree internal rotation view was taken of the right hip. Right hip  was prepped and draped in the usual sterile fashion. A 10 cm incision  was made starting lateral to the ASIS and extending distally after  incising through the skin. TFL fascia was incised. TFL was taken  laterally, sartorius and rectus medially. The ascending branch of the  lateral femoral circumflex vessels was identified and cauterized. Anterior capsulotomy was performed. Neck resection was made at the  saddle of the neck and equator of the femoral head. Neck fragment and  femoral head were placed in formalin for pathology. We then exposed the  proximal femur. We did notice that the patient did have a touch of some  excess of anteversion. We first broached up with the Corail system and  broached up to an 11 broach which started to actually _____ distally. We removed this, then exposed the acetabulum. Labrum was excised. We  started reaming with a 47 mm reamer and reamed up to 51 mm reamer. Trial 51 shell fit snuggly, so we touch reamed with a 52 mm reamer. The  final size 52 mm GRIPTION PINNACLE shell was press-fit in about 40  degrees of abduction and 20 degrees of anteversion and had excellent  purchase. I placed a trial liner and several trial reductions were  done. We then noted at this time that we needed to size up the stem,  but did a narrow canal, so I thought her anatomy would be best suited  for an ACTIS stem, so we removed the Corail trial and then broached  sequentially up to a size 6 ACTIS broach which had excellent purchase  and fill.   Repeat trials were done and our [address]  was used. This led us to use a standard offset stem. We removed the  trial, drilled and measured and placed a 30 mm screw in the acetabulum. A 36 liner was locked in the acetabular shell. We then selected the  final size 6 standard offset ACTIS stem, press-fit this down the canal  and had very good purchase. Repeat trials were done. The final 36 mm  +5 ceramic head was selected and placed on the [facility]. Hip was  reduced. We had excellent stability to anterior maneuvers including  hyperextension with external rotation and adduction, and no evidence of  any anterior instability at all. Fluoroscopic views revealed an  excellent alignment of the femoral and acetabular components. Our final numbers revealed that we lengthened the leg by 2 mm, added 8  mm to the femoral offset, and 1 mm to the total offset. Abduction was  40 degrees and anteversion was 21 degrees. This was well within our  templating goals. We irrigated with a total of 3 liters of irrigation. We mixed 20 mL of Exparel with 30 mL of saline and 50 mL of 0.25%  Marcaine. We injected the TFL and rectus muscles with this solution. TFL fascia was closed with 0 Vicryl suture, 2-0 Vicryl suture for  subcutaneous tissues, and 3-0 Vicryl for subcuticular stitch. Prineo  Dermabond and soft dressings were applied. The patient was taken to the  recovery room in stable condition. POSTOPERATIVE PLANS:  1. She will be on typical postop protocol. 2.  She will be on 2 doses of Ancef and 6 doses of clindamycin. 3.  She will be on Xarelto 10 mg a day for 21 days followed by baby  aspirin 81 mg twice a day for another 3 weeks. Please note due to the fact that she is on Imuran, we will probably  discharge her on 5 days of Bactrim when she goes home as well.         Janene Hines MD    D: 10/24/2022 11:07:38      T: 10/24/2022 14:35:19     [email or web address]  Job#: [id number]     Doc#: [id number]    CC:

## 2022-10-24 NOTE — ANESTHESIA PROCEDURE NOTES
Spinal Block    Patient location during procedure: OR  End time: 10/24/2022 8:19 AM  Reason for block: primary anesthetic  Staffing  Performed: resident/CRNA   Resident/CRNA: YULIYA Wilkerson CRNA  Spinal Block  Patient position: sitting  Prep: Betadine  Patient monitoring: continuous pulse ox and frequent blood pressure checks  Approach: midline  Location: L3/L4  Guidance: paresthesia technique  Provider prep: mask and sterile gloves  Local infiltration: lidocaine  Needle  Needle type: Pencan   Needle gauge: 24 G  Needle length: 4 in  Assessment  Swirl obtained: Yes  CSF: clear  Attempts: 1  Hemodynamics: stable  Preanesthetic Checklist  Completed: patient identified, IV checked, site marked, risks and benefits discussed, surgical/procedural consents, equipment checked, pre-op evaluation, timeout performed, anesthesia consent given, oxygen available, monitors applied/VS acknowledged, fire risk safety assessment completed and verbalized and blood product R/B/A discussed and consented

## 2022-10-25 PROBLEM — M16.11 PRIMARY OSTEOARTHRITIS OF RIGHT HIP: Status: ACTIVE | Noted: 2022-10-24

## 2022-10-25 LAB
ANION GAP SERPL CALCULATED.3IONS-SCNC: 9 MMOL/L (ref 7–19)
BUN BLDV-MCNC: 11 MG/DL (ref 6–20)
CALCIUM SERPL-MCNC: 7.9 MG/DL (ref 8.6–10)
CHLORIDE BLD-SCNC: 105 MMOL/L (ref 98–111)
CO2: 22 MMOL/L (ref 22–29)
CREAT SERPL-MCNC: 0.8 MG/DL (ref 0.5–0.9)
GFR SERPL CREATININE-BSD FRML MDRD: >60 ML/MIN/{1.73_M2}
GLUCOSE BLD-MCNC: 128 MG/DL (ref 74–109)
HCT VFR BLD CALC: 31.7 % (ref 37–47)
HEMOGLOBIN: 10.6 G/DL (ref 12–16)
POTASSIUM REFLEX MAGNESIUM: 4.1 MMOL/L (ref 3.5–5)
SODIUM BLD-SCNC: 136 MMOL/L (ref 136–145)

## 2022-10-25 PROCEDURE — 96365 THER/PROPH/DIAG IV INF INIT: CPT

## 2022-10-25 PROCEDURE — 2580000003 HC RX 258: Performed by: ORTHOPAEDIC SURGERY

## 2022-10-25 PROCEDURE — 2500000003 HC RX 250 WO HCPCS: Performed by: ORTHOPAEDIC SURGERY

## 2022-10-25 PROCEDURE — 97530 THERAPEUTIC ACTIVITIES: CPT

## 2022-10-25 PROCEDURE — 6360000002 HC RX W HCPCS: Performed by: ORTHOPAEDIC SURGERY

## 2022-10-25 PROCEDURE — 85018 HEMOGLOBIN: CPT

## 2022-10-25 PROCEDURE — 97161 PT EVAL LOW COMPLEX 20 MIN: CPT

## 2022-10-25 PROCEDURE — 80048 BASIC METABOLIC PNL TOTAL CA: CPT

## 2022-10-25 PROCEDURE — 96375 TX/PRO/DX INJ NEW DRUG ADDON: CPT

## 2022-10-25 PROCEDURE — 85014 HEMATOCRIT: CPT

## 2022-10-25 PROCEDURE — 6370000000 HC RX 637 (ALT 250 FOR IP): Performed by: ORTHOPAEDIC SURGERY

## 2022-10-25 PROCEDURE — 97166 OT EVAL MOD COMPLEX 45 MIN: CPT

## 2022-10-25 PROCEDURE — 36415 COLL VENOUS BLD VENIPUNCTURE: CPT

## 2022-10-25 PROCEDURE — G0378 HOSPITAL OBSERVATION PER HR: HCPCS

## 2022-10-25 PROCEDURE — 97535 SELF CARE MNGMENT TRAINING: CPT

## 2022-10-25 PROCEDURE — 96366 THER/PROPH/DIAG IV INF ADDON: CPT

## 2022-10-25 PROCEDURE — 97116 GAIT TRAINING THERAPY: CPT

## 2022-10-25 RX ORDER — RIVAROXABAN 10 MG/1
TABLET, FILM COATED ORAL
Qty: 21 TABLET | Refills: 0 | Status: SHIPPED | OUTPATIENT
Start: 2022-10-25 | End: 2022-10-26 | Stop reason: HOSPADM

## 2022-10-25 RX ORDER — OXYCODONE HYDROCHLORIDE 5 MG/1
5 TABLET ORAL SEE ADMIN INSTRUCTIONS
Qty: 90 TABLET | Refills: 0 | Status: SHIPPED | OUTPATIENT
Start: 2022-10-25 | End: 2022-11-24

## 2022-10-25 RX ORDER — ONDANSETRON 4 MG/1
4 TABLET, FILM COATED ORAL EVERY 8 HOURS PRN
Qty: 30 TABLET | Refills: 0 | Status: SHIPPED | OUTPATIENT
Start: 2022-10-25

## 2022-10-25 RX ORDER — DOXYCYCLINE HYCLATE 100 MG
100 TABLET ORAL 2 TIMES DAILY
Qty: 10 TABLET | Refills: 0 | Status: SHIPPED | OUTPATIENT
Start: 2022-10-25 | End: 2022-10-30

## 2022-10-25 RX ADMIN — MESALAMINE 1.2 G: 1.2 TABLET, DELAYED RELEASE ORAL at 07:55

## 2022-10-25 RX ADMIN — ONDANSETRON 4 MG: 2 INJECTION INTRAMUSCULAR; INTRAVENOUS at 20:01

## 2022-10-25 RX ADMIN — ACETAMINOPHEN 650 MG: 325 TABLET, FILM COATED ORAL at 07:45

## 2022-10-25 RX ADMIN — OXYCODONE 10 MG: 5 TABLET ORAL at 16:34

## 2022-10-25 RX ADMIN — OXYCODONE 10 MG: 5 TABLET ORAL at 21:21

## 2022-10-25 RX ADMIN — TRAMADOL HYDROCHLORIDE 100 MG: 50 TABLET, COATED ORAL at 17:25

## 2022-10-25 RX ADMIN — OXYCODONE 10 MG: 5 TABLET ORAL at 01:25

## 2022-10-25 RX ADMIN — AZATHIOPRINE 100 MG: 50 TABLET ORAL at 09:37

## 2022-10-25 RX ADMIN — TRIAMTERENE AND HYDROCHLOROTHIAZIDE 1 TABLET: 37.5; 25 TABLET ORAL at 07:46

## 2022-10-25 RX ADMIN — CLINDAMYCIN PHOSPHATE 900 MG: 900 INJECTION, SOLUTION INTRAVENOUS at 11:42

## 2022-10-25 RX ADMIN — TRAMADOL HYDROCHLORIDE 100 MG: 50 TABLET, COATED ORAL at 06:24

## 2022-10-25 RX ADMIN — SODIUM CHLORIDE, PRESERVATIVE FREE 10 ML: 5 INJECTION INTRAVENOUS at 20:01

## 2022-10-25 RX ADMIN — RIVAROXABAN 10 MG: 10 TABLET, FILM COATED ORAL at 17:25

## 2022-10-25 RX ADMIN — CEFAZOLIN 2000 MG: 2 INJECTION, POWDER, FOR SOLUTION INTRAMUSCULAR; INTRAVENOUS at 00:57

## 2022-10-25 RX ADMIN — OXYCODONE 10 MG: 5 TABLET ORAL at 08:02

## 2022-10-25 RX ADMIN — SODIUM CHLORIDE: 9 INJECTION, SOLUTION INTRAVENOUS at 01:29

## 2022-10-25 RX ADMIN — BISACODYL 5 MG: 5 TABLET, COATED ORAL at 07:54

## 2022-10-25 RX ADMIN — ACETAMINOPHEN 650 MG: 325 TABLET, FILM COATED ORAL at 20:01

## 2022-10-25 RX ADMIN — ACETAMINOPHEN 650 MG: 325 TABLET, FILM COATED ORAL at 12:53

## 2022-10-25 RX ADMIN — CLINDAMYCIN PHOSPHATE 900 MG: 900 INJECTION, SOLUTION INTRAVENOUS at 20:05

## 2022-10-25 RX ADMIN — CLINDAMYCIN PHOSPHATE 900 MG: 900 INJECTION, SOLUTION INTRAVENOUS at 03:52

## 2022-10-25 RX ADMIN — NORETHINDRONE ACETATE AND ETHINYL ESTRADIOL, AND FERROUS FUMARATE 1 CAPSULE: KIT at 07:55

## 2022-10-25 RX ADMIN — TRAMADOL HYDROCHLORIDE 100 MG: 50 TABLET, COATED ORAL at 11:42

## 2022-10-25 RX ADMIN — ACETAMINOPHEN 650 MG: 325 TABLET, FILM COATED ORAL at 01:26

## 2022-10-25 RX ADMIN — SODIUM CHLORIDE, PRESERVATIVE FREE 10 ML: 5 INJECTION INTRAVENOUS at 07:55

## 2022-10-25 ASSESSMENT — PAIN SCALES - GENERAL
PAINLEVEL_OUTOF10: 0
PAINLEVEL_OUTOF10: 6
PAINLEVEL_OUTOF10: 2
PAINLEVEL_OUTOF10: 4
PAINLEVEL_OUTOF10: 3
PAINLEVEL_OUTOF10: 4
PAINLEVEL_OUTOF10: 2
PAINLEVEL_OUTOF10: 4
PAINLEVEL_OUTOF10: 2
PAINLEVEL_OUTOF10: 3
PAINLEVEL_OUTOF10: 5

## 2022-10-25 ASSESSMENT — PAIN DESCRIPTION - LOCATION
LOCATION: HIP

## 2022-10-25 ASSESSMENT — PAIN DESCRIPTION - ORIENTATION
ORIENTATION: RIGHT

## 2022-10-25 ASSESSMENT — PAIN - FUNCTIONAL ASSESSMENT
PAIN_FUNCTIONAL_ASSESSMENT: PREVENTS OR INTERFERES SOME ACTIVE ACTIVITIES AND ADLS
PAIN_FUNCTIONAL_ASSESSMENT: PREVENTS OR INTERFERES SOME ACTIVE ACTIVITIES AND ADLS
PAIN_FUNCTIONAL_ASSESSMENT: ACTIVITIES ARE NOT PREVENTED

## 2022-10-25 ASSESSMENT — PAIN DESCRIPTION - DESCRIPTORS
DESCRIPTORS: ACHING
DESCRIPTORS: SORE
DESCRIPTORS: ACHING

## 2022-10-25 ASSESSMENT — PAIN DESCRIPTION - ONSET
ONSET: ON-GOING

## 2022-10-25 ASSESSMENT — PAIN DESCRIPTION - FREQUENCY
FREQUENCY: CONTINUOUS
FREQUENCY: INTERMITTENT
FREQUENCY: CONTINUOUS

## 2022-10-25 ASSESSMENT — PAIN DESCRIPTION - PAIN TYPE
TYPE: SURGICAL PAIN

## 2022-10-25 NOTE — PROGRESS NOTES
Physical Therapy  Name: Mark Rasheed  MRN:  [id number]  Date of service:  10/25/2022     10/25/22 1445   Restrictions/Precautions   Restrictions/Precautions Fall Risk;Weight Bearing   Required Braces or Orthoses? No   Lower Extremity Weight Bearing Restrictions   Right Lower Extremity Weight Bearing Weight Bearing As Tolerated   Position Activity Restriction   Other position/activity restrictions ANT IVORY PRECAUTIONS   Subjective   Subjective Pt ready to walk again. Bed Mobility   Supine to Sit Stand by assistance   Sit to Supine Stand by assistance   Transfers   Sit to Stand Stand by assistance   Stand to Sit Stand by assistance   Ambulation   WB Status FWB   Ambulation   Device Rolling Walker   Assistance Contact guard assistance   Quality of Gait step-thru pattern, adequate WB thru BLE   Gait Deviations Decreased step length;Decreased step height   Distance 75', 10'   Short Term Goals   Time Frame for Short Term Goals 14 DAYS   Short Term Goal 1 BED MOB INDEPENDENT   Short Term Goal 2 TRANSFERS MOD IND   Short Term Goal 3 ' RW SUPERVISION   Conditions Requiring Skilled Therapeutic Intervention   Body Structures, Functions, Activity Limitations Requiring Skilled Therapeutic Intervention Decreased functional mobility ; Decreased ADL status; Decreased ROM; Decreased strength;Decreased balance; Increased pain   Assessment Pt did well with ambulation in hallway, assisted to/from the BR then back to bed. Will cont to progress as tolerated.    Activity Tolerance   Activity Tolerance Patient tolerated treatment well   PT Plan of Care   Tuesday X   Safety Devices   Type of Devices Bed alarm in place;Call light within reach;Gait belt;Left in bed         Electronically signed by Jose Kohler PTA on 10/25/2022 at 2:57 PM

## 2022-10-25 NOTE — PROGRESS NOTES
Physical Therapy  Facility/Department: [facility] SURG SERVICES  Physical Therapy Initial Assessment    Name: Alen Acevedo  : 1970  MRN: [id number]  Date of Service: 10/25/2022    Discharge Recommendations:  Continue to assess pending progress, 24 hour supervision or assist, Home with Home health PT          Patient Diagnosis(es): The encounter diagnosis was Primary osteoarthritis of right hip. Past Medical History:  has a past medical history of Back disorder, Colitis, Connective tissue and disc stenosis of intervertebral foramina of lumbar region, Hypertension, Localized swelling of both lower legs, and Lumbar radiculopathy, right. Past Surgical History:  has a past surgical history that includes Breast enhancement surgery;  section; Dilation and curettage of uterus; Lumbar spine surgery (Right, 2016); Colonoscopy; Microdiscectomy lumbar; Total hip arthroplasty (Left); and Total hip arthroplasty (Right, 10/24/2022). Assessment   Body Structures, Functions, Activity Limitations Requiring Skilled Therapeutic Intervention: Decreased functional mobility ; Decreased ADL status; Decreased ROM; Decreased strength;Decreased balance; Increased pain  Assessment: Pt ABLE TO AMB TO BR THEN AROUND ROOM WITH WALKER. WILL PROGRESS WITH HOUSEHOLD DISTANCES. Requires PT Follow-Up: Yes  Activity Tolerance  Activity Tolerance: Patient tolerated treatment well     Plan   Physcial Therapy Plan  General Plan: 6-7 times per week  Current Treatment Recommendations: Strengthening, Balance training, Functional mobility training, ROM, Transfer training, Gait training, Stair training, Patient/Caregiver education & training, Safety education & training  Safety Devices  Type of Devices:  All fall risk precautions in place, Left in chair, Call light within reach, Heels elevated for pressure relief (pt agreed to using call light before getting up out of chair) Restrictions  Restrictions/Precautions  Restrictions/Precautions: Fall Risk, Weight Bearing  Required Braces or Orthoses?: No  Lower Extremity Weight Bearing Restrictions  Right Lower Extremity Weight Bearing: Weight Bearing As Tolerated  Position Activity Restriction  Other position/activity restrictions: ANT IVORY PRECAUTIONS     Subjective   Pain: REPORTS 1/10 WITH ACTIVITY  General  Patient assessed for rehabilitation services?: Yes  Diagnosis: R THR  Subjective  Subjective: Pt READY TO GET OOB         Social/Functional History  Social/Functional History  Lives With: Spouse  Type of Home: House  Home Layout: Able to Live on Main level with bedroom/bathroom, Two level  Home Access: Stairs to enter without rails  Entrance Stairs - Number of Steps: 3  Bathroom Shower/Tub: Walk-in shower  Bathroom Equipment: Grab bars in shower, 3-in-1 commode, Shower chair  Home Equipment: Walker, rolling  ADL Assistance: Independent  Ambulation Assistance: Independent  Transfer Assistance: Independent  Vision/Hearing       Cognition   Orientation  Overall Orientation Status: Within Normal Limits     Objective   Heart Rate: 63  Heart Rate Source: Monitor  BP: 120/71  BP Location: Left upper arm  BP Method: Automatic  Patient Position: Supine  MAP (Calculated): 87.33  Resp: 16  SpO2: 98 %  O2 Device: None (Room air)     Observation/Palpation  Posture: Good  Gross Assessment  AROM:  (R HIP DEC MINIMALLY OVERALL)  Strength:  (R HIP < 4/5)                 Gait  Overall Level of Assistance: Contact-guard assistance  Assistive Device: Walker, rolling  Bed mobility  Supine to Sit: Supervision  Transfers  Sit to Stand: Contact guard assistance  Stand to Sit: Contact guard assistance  Bed to Chair: Contact guard assistance  Ambulation  WB Status: FWB  Ambulation  Device: Rolling Walker  Assistance: Contact guard assistance  Quality of Gait: STEP-THROUGH PATTERN, GUARDED PACE, GOOD WEIGHTBEARING  Gait Deviations: Decreased step length;Decreased step height  Distance: 10' + 20'     Balance  Sitting - Dynamic: Good  Standing - Dynamic: Fair;+           OutComes Score                                                  AM-PAC Score             Tinneti Score       Goals  Short Term Goals  Time Frame for Short Term Goals: 14 DAYS  Short Term Goal 1: BED MOB INDEPENDENT  Short Term Goal 2: TRANSFERS MOD IND  Short Term Goal 3: ' RW SUPERVISION       Education  Patient Education  Education Given To: Patient  Education Provided: Role of Therapy;Plan of Care;Precautions      Therapy Time   Individual Concurrent Group Co-treatment   Time In           Time Out           Minutes                   Deyvi Sharma PT

## 2022-10-25 NOTE — PROGRESS NOTES
Subjective:     Post-Operative Day: 1 Status Post right IVORY. Pt is awake and alert. Ox3. Doing well this am.  No new issues. Systemic or Specific Complaints:No Complaints  no nausea and no vomiting Pain 5    Objective:     Patient Vitals for the past 24 hrs:   BP Temp Temp src Pulse Resp SpO2 Height Weight   10/25/22 0355 117/71 97.4 °F (36.3 °C) Temporal 63 16 99 % -- --   10/25/22 0155 -- -- -- -- 16 -- -- --   10/25/22 0019 -- -- -- -- 15 -- -- --   10/24/22 2349 105/65 97.9 °F (36.6 °C) Temporal 70 16 98 % -- --   10/24/22 2005 -- -- -- -- 16 -- -- --   10/24/22 1935 -- -- -- -- 15 -- -- --   10/24/22 1930 111/75 97 °F (36.1 °C) Temporal 62 16 96 % -- --   10/24/22 1633 126/70 97.2 °F (36.2 °C) Temporal 64 16 97 % -- --   10/24/22 1336 119/70 (!) 96.6 °F (35.9 °C) Temporal 70 18 99 % -- --   10/24/22 1232 117/79 (!) 96.6 °F (35.9 °C) Temporal 68 16 100 % -- --   10/24/22 1144 124/72 96.8 °F (36 °C) Temporal 56 16 99 % -- --   10/24/22 1110 110/76 97.2 °F (36.2 °C) Temporal 64 12 100 % -- --   10/24/22 1053 -- -- -- 62 14 96 % -- --   10/24/22 1050 100/68 97.8 °F (36.6 °C) Temporal 63 13 97 % -- --   10/24/22 1047 -- -- -- 65 -- -- -- --   10/24/22 1035 96/65 -- -- 67 14 96 % -- --   10/24/22 1030 101/63 -- -- 73 14 95 % -- --   10/24/22 1025 95/63 97 °F (36.1 °C) Temporal 76 14 94 % -- --   10/24/22 0724 (!) 154/90 98 °F (36.7 °C) Tympanic 63 14 98 % 5' 10\" (1.778 m) 190 lb (86.2 kg)       General: alert, appears stated age, cooperative, and no distress   Exam: Good active motion to right lower extremity   Wound: Wound clean and dry no evidence of infection. , No Drainage and Wound Intact   Neurovascular: Exam normal     DVT Exam: No evidence of DVT seen on physical exam.   Xray:  right Hip implants in good position        Data Review:  Recent Labs     10/25/22  0312   HGB 10.6*     Recent Labs     10/25/22  0312      K 4.1   CREATININE 0.8     Recent Labs     10/25/22  0312   LABGLOM >60     No results for input(s): INR in the last 72 hours. Assessment:     Status Post right IVORY. Plan:     Pt is doing well this am.  Plan for home tomorrow with home health as per anterior hip protocol. She will be on 5 days of Doxycycline as well. She will continue with her current nursing cares and PT.       Electronically signed by Janeann Primrose, PA-C on 10/25/2022 at 7:23 AM

## 2022-10-25 NOTE — PROGRESS NOTES
Occupational Therapy Initial Assessment  Date: 10/25/2022   Patient Name: Emir Crain  MRN: [id number]     : 1970    Date of Service: 10/25/2022    Discharge Recommendations:  24 hour supervision or assist (tentative plan for [facility] PT)  OT Equipment Recommendations  Equipment Needed: No    Assessment   Assessment: OT evaluation and single-tx completed. Pt does not display need for continued OT services in this setting. Concerns with ANT IVORY precautions, in relation to ADLs with distal LE involvement, have been satisfied. Pt will have ability to use familial support and AE to ensure integrity of surgical intervention. Ambulation deficits can be sufficiently addressed with PT. OT does not anticipate any environmental barriers to D/C home if 24/ supervision/assist is provided. Tentative plan to D/C home with [facility] PT.  No Skilled OT: Safe to return home (Largest hindrance to function is ANT IVORY restricitions; not possible to restore with OT tx)  REQUIRES OT FOLLOW-UP: No  Activity Tolerance  Activity Tolerance: Patient Tolerated treatment well              Patient Diagnosis(es): The encounter diagnosis was Primary osteoarthritis of right hip.     Past Medical History:   Past Medical History:   Diagnosis Date    Back disorder     bulging disk    Colitis     Connective tissue and disc stenosis of intervertebral foramina of lumbar region 2016    Hypertension     Localized swelling of both lower legs     Lumbar radiculopathy, right 2016        Past Surgical History:   Past Surgical History:   Procedure Laterality Date    BREAST ENHANCEMENT SURGERY       SECTION      COLONOSCOPY      2020    DILATION AND CURETTAGE OF UTERUS      LUMBAR SPINE SURGERY Right 2016    RIGHT L5-S1 MICRODISCECTOMY  performed by Alexandria Robles MD at [address] Left     TOTAL HIP ARTHROPLASTY Right 10/24/2022    RIGHT TOTAL HIP REPLACEMENT performed by Hussein Fraga Valencia Pabon MD at [address] [place] [address]  Restrictions/Precautions  Restrictions/Precautions: Fall Risk, Weight Bearing  Required Braces or Orthoses?: No  Lower Extremity Weight Bearing Restrictions  Right Lower Extremity Weight Bearing: Weight Bearing As Tolerated  Position Activity Restriction  Other position/activity restrictions: ANT IVORY PRECAUTIONS    Subjective      Pain Assessment  Pain Assessment: 0-10  Pain Level: 4  Patient's Stated Pain Goal: 0 - No pain  Pain Location: Hip  Pain Orientation: Right  Pain Descriptors: Aching  Functional Pain Assessment: Prevents or interferes some active activities and ADLs  Pain Type: Surgical pain  Pain Radiating Towards: na  Pain Frequency: Continuous  Pain Onset: On-going  Non-Pharmaceutical Pain Intervention(s): Ice;Repositioned  Response to Pain Intervention: Patient satisfied  Side Effects: No reported side effects  Pre Treatment Pain Screening  Pain at present: 2  Scale Used: Numeric Score  Intervention List: Patient able to continue with treatment  Vital Signs  Temp: 97.5 °F (36.4 °C)  Temp Source: Temporal  Heart Rate: 63  Heart Rate Source: Monitor  Resp: 16  BP: 120/71  BP Location: Left upper arm  BP Method: Automatic  MAP (Calculated): 87.33  Patient Position: Supine  Level of Consciousness: Alert (0)  MEWS Score: 1  Oxygen Therapy  SpO2: 98 %  O2 Device: None (Room air)    Social/Functional History  Social/Functional History  Lives With: Spouse  Type of Home: House  Home Layout: Able to Live on Main level with bedroom/bathroom, Two level  Home Access: Stairs to enter without rails  Entrance Stairs - Number of Steps: 3  Bathroom Shower/Tub: Walk-in shower  Bathroom Equipment: Grab bars in shower, 3-in-1 commode, Shower chair  Home Equipment: Walker, rolling  ADL Assistance: Independent  Ambulation Assistance: Independent  Transfer Assistance: Independent       Objective   Hearing: Within functional limits          Toilet Transfers  Toilet - Technique: Ambulating  Equipment Used: Raised toilet seat with rails  Toilet Transfer: Contact guard assistance  Toilet Transfers Comments: with r/w  ADL  Feeding: Independent  Grooming: Supervision  Grooming Skilled Clinical Factors: standing sinkside  UE Bathing: Supervision  UE Bathing Skilled Clinical Factors: seated  LE Bathing: Minimal assistance  LE Bathing Skilled Clinical Factors: distal portions  UE Dressing: Supervision;Setup  UE Dressing Skilled Clinical Factors: seated  LE Dressing: Minimal assistance  LE Dressing Skilled Clinical Factors: distal portions  Toileting: Contact guard assistance        Bed mobility  Supine to Sit: Stand by assistance  Sit to Supine: Unable to assess  Transfers  Stand Step Transfers: Contact guard assistance  Sit to stand: Stand by assistance  Stand to sit: Stand by assistance  Transfer Comments: with r/w     Cognition  Overall Cognitive Status: WFL               Gross Assessment  AROM: Within functional limits  Strength: Within functional limits  Coordination: Within functional limits                    Included Treatment  Tx consisted of: bed mobility; pt education; transfer training; DME training/discussion of AE options; activity tolerance/balance challenges; functional ambulation; positioning; and toileting skills. (Treatment time: 30 min)        Plan   Occupational Therapy Plan  Additional Comments: EVAL ONLY 10/25    Goals  Short Term Goals  Short Term Goal 1: Complete toileting skills with CGA (GOAL MET). Short Term Goal 2: Pt/family will verbalize/demo: AE/DME options; ANT IVORY /WB precautions; recommended therapeutic activities; homemaking/IADL strategies; energy conservation techniques; and fall prevention strategies (GOAL MET).                HENRIETTA Lai/L  Electronically signed by Bertin SHRESTHA/L on 10/25/2022 at 9:19 AM.

## 2022-10-26 VITALS
BODY MASS INDEX: 27.2 KG/M2 | TEMPERATURE: 98.4 F | WEIGHT: 190 LBS | HEIGHT: 70 IN | DIASTOLIC BLOOD PRESSURE: 59 MMHG | RESPIRATION RATE: 16 BRPM | SYSTOLIC BLOOD PRESSURE: 97 MMHG | HEART RATE: 65 BPM | OXYGEN SATURATION: 97 %

## 2022-10-26 LAB
ANION GAP SERPL CALCULATED.3IONS-SCNC: 6 MMOL/L (ref 7–19)
BUN BLDV-MCNC: 12 MG/DL (ref 6–20)
CALCIUM SERPL-MCNC: 8.2 MG/DL (ref 8.6–10)
CHLORIDE BLD-SCNC: 103 MMOL/L (ref 98–111)
CO2: 28 MMOL/L (ref 22–29)
CREAT SERPL-MCNC: 0.8 MG/DL (ref 0.5–0.9)
GFR SERPL CREATININE-BSD FRML MDRD: >60 ML/MIN/{1.73_M2}
GLUCOSE BLD-MCNC: 118 MG/DL (ref 74–109)
HCT VFR BLD CALC: 30.3 % (ref 37–47)
HEMOGLOBIN: 10 G/DL (ref 12–16)
POTASSIUM REFLEX MAGNESIUM: 4.1 MMOL/L (ref 3.5–5)
SODIUM BLD-SCNC: 137 MMOL/L (ref 136–145)

## 2022-10-26 PROCEDURE — 97530 THERAPEUTIC ACTIVITIES: CPT

## 2022-10-26 PROCEDURE — 36415 COLL VENOUS BLD VENIPUNCTURE: CPT

## 2022-10-26 PROCEDURE — 96366 THER/PROPH/DIAG IV INF ADDON: CPT

## 2022-10-26 PROCEDURE — 97116 GAIT TRAINING THERAPY: CPT

## 2022-10-26 PROCEDURE — 85018 HEMOGLOBIN: CPT

## 2022-10-26 PROCEDURE — 6370000000 HC RX 637 (ALT 250 FOR IP): Performed by: ORTHOPAEDIC SURGERY

## 2022-10-26 PROCEDURE — 85014 HEMATOCRIT: CPT

## 2022-10-26 PROCEDURE — 80048 BASIC METABOLIC PNL TOTAL CA: CPT

## 2022-10-26 PROCEDURE — G0378 HOSPITAL OBSERVATION PER HR: HCPCS

## 2022-10-26 PROCEDURE — 6360000002 HC RX W HCPCS: Performed by: ORTHOPAEDIC SURGERY

## 2022-10-26 PROCEDURE — 2500000003 HC RX 250 WO HCPCS: Performed by: ORTHOPAEDIC SURGERY

## 2022-10-26 PROCEDURE — 2580000003 HC RX 258: Performed by: ORTHOPAEDIC SURGERY

## 2022-10-26 RX ADMIN — SODIUM CHLORIDE, PRESERVATIVE FREE 10 ML: 5 INJECTION INTRAVENOUS at 08:17

## 2022-10-26 RX ADMIN — AZATHIOPRINE 100 MG: 50 TABLET ORAL at 08:15

## 2022-10-26 RX ADMIN — NORETHINDRONE ACETATE AND ETHINYL ESTRADIOL, AND FERROUS FUMARATE 1 CAPSULE: KIT at 08:14

## 2022-10-26 RX ADMIN — TRAMADOL HYDROCHLORIDE 100 MG: 50 TABLET, COATED ORAL at 03:24

## 2022-10-26 RX ADMIN — TRIAMTERENE AND HYDROCHLOROTHIAZIDE 1 TABLET: 37.5; 25 TABLET ORAL at 08:15

## 2022-10-26 RX ADMIN — BISACODYL 5 MG: 5 TABLET, COATED ORAL at 08:14

## 2022-10-26 RX ADMIN — OXYCODONE 10 MG: 5 TABLET ORAL at 05:01

## 2022-10-26 RX ADMIN — TRAMADOL HYDROCHLORIDE 100 MG: 50 TABLET, COATED ORAL at 08:15

## 2022-10-26 RX ADMIN — ACETAMINOPHEN 650 MG: 325 TABLET, FILM COATED ORAL at 08:15

## 2022-10-26 RX ADMIN — MESALAMINE 1.2 G: 1.2 TABLET, DELAYED RELEASE ORAL at 08:17

## 2022-10-26 RX ADMIN — ACETAMINOPHEN 650 MG: 325 TABLET, FILM COATED ORAL at 03:24

## 2022-10-26 RX ADMIN — CLINDAMYCIN PHOSPHATE 900 MG: 900 INJECTION, SOLUTION INTRAVENOUS at 03:28

## 2022-10-26 ASSESSMENT — PAIN DESCRIPTION - DESCRIPTORS: DESCRIPTORS: ACHING;SORE

## 2022-10-26 ASSESSMENT — PAIN SCALES - GENERAL
PAINLEVEL_OUTOF10: 4
PAINLEVEL_OUTOF10: 5
PAINLEVEL_OUTOF10: 6

## 2022-10-26 ASSESSMENT — PAIN DESCRIPTION - PAIN TYPE: TYPE: ACUTE PAIN;SURGICAL PAIN

## 2022-10-26 ASSESSMENT — PAIN DESCRIPTION - ORIENTATION: ORIENTATION: RIGHT

## 2022-10-26 ASSESSMENT — PAIN DESCRIPTION - LOCATION: LOCATION: HIP

## 2022-10-26 ASSESSMENT — PAIN - FUNCTIONAL ASSESSMENT: PAIN_FUNCTIONAL_ASSESSMENT: ACTIVITIES ARE NOT PREVENTED

## 2022-10-26 NOTE — DISCHARGE INSTR - DIET
Good nutrition is important when healing from an illness, injury, or surgery. Follow any nutrition recommendations given to you during your hospital stay. If you were given an oral nutrition supplement while in the hospital, continue to take this supplement at home. You can take it with meals, in-between meals, and/or before bedtime. These supplements can be purchased at most local grocery stores, pharmacies, and chain [employer]-stores. If you have any questions about your diet or nutrition, call the hospital and ask for the dietitian.             Resume home diet

## 2022-10-26 NOTE — PROGRESS NOTES
Patient discharged home today with [facility]. Went over all discharge instructions and new medications with patient and provided a copy of all new medications to take home. Patient verbalized understanding. Patient was stable upon discharge.    Electronically signed by Tracy Knight RN on 10/26/2022 at 10:18 AM

## 2022-10-26 NOTE — PROGRESS NOTES
Subjective:     Post-Operative Day: 2 Status Post right IVORY. Pt is awake and alert. Ox3. Doing well this am.  She was able to ambulate 115 ft  with PT. No new issues. Systemic or Specific Complaints: No Complaints  no nausea and no vomiting Pain 6. Objective:     Patient Vitals for the past 24 hrs:   BP Temp Temp src Pulse Resp SpO2   10/26/22 0737 (!) 97/59 98.4 °F (36.9 °C) Temporal 65 16 97 %   10/26/22 0449 101/63 97.9 °F (36.6 °C) Temporal 70 16 97 %   10/26/22 0123 100/63 98.1 °F (36.7 °C) Temporal 72 16 97 %   10/25/22 2115 107/66 97.9 °F (36.6 °C) Temporal 74 18 100 %   10/25/22 1726 111/72 97.2 °F (36.2 °C) Temporal 67 16 99 %   10/25/22 1210 109/71 (!) 96.6 °F (35.9 °C) Temporal 68 18 97 %       General: alert, appears stated age, cooperative, and no distress   Exam: Good active motion to right lower extremity   Wound: Wound clean and dry no evidence of infection. , No Drainage and Wound Intact   Neurovascular: Exam normal     DVT Exam: No evidence of DVT seen on physical exam.   Xray:  none     Data Review:  Recent Labs     10/25/22  0312 10/26/22  0318   HGB 10.6* 10.0*     Recent Labs     10/26/22  0318      K 4.1   CREATININE 0.8     Recent Labs     10/26/22  0318   LABGLOM >60     No results for input(s): INR in the last 72 hours. Assessment:     Status Post right IVORY. Plan:     Pt is doing well this am.  Plan for home today with home health as per anterior hip protocol. She will be on 5 days of Doxycycline as well. She will continue with her current nursing cares and PT.       Electronically signed by Lucia Merchant PA-C on 10/26/2022 at 8:09 AM

## 2022-10-26 NOTE — DISCHARGE INSTRUCTIONS
Dr Meir Tucker Total Hip Replacement  Home Instructions     * Elevate and ice affected extremity 20-30 minutes every 2 hours as needed for swelling and pain. Use a barrier ( cloth) between ice pack and skin to prevent frostbite. *Surgical Site Care:         Cleanse crease once daily with soap and water. Clean crease three times daily with rubbing alcohol avoiding incision line. The adhesive dressing (glue strip) can be removed in 2 weeks. May shower and clean wound on Wednesday but do not scrub incision. Pat dry. NO tub bathing or swimming. Wash hands frequently during the day. *Activity:         SAFETY FIRST walk with a walker         Home Health therapy will come to the house two times a week to make sure you can do things around the house:          Get in and out of your bed          Getting up and down out of the chair          Bathroom  / bathing activities          Do NOT walk for exercise ( it will increase pain and swelling )          Walk several times a day but only for short distances                                                                                                        FEVER of 101.5 or less  *Pain Medicines:                                                                         Take Tylenol x 2          Take prescribed medicine as needed for pain                      Deep breath x 10          Take Tylenol as your pain decreases                                   Cough, Cough, Cough          Take a stool softener of your choice while on pain meds     Recheck in 1-11/2 hours     *To prevent blood clots: Take prescribed blood thinner as directed. (Eliquis 2.5 mg two timesdaily for 3 weeks)          After completing, you will take E.C.  Aspirin 81 mg twice a day for three more weeks          While taking the blood thinner and Aspirin, DO NOT take any other NSAIDS like Naprosyn, Ibuprofen, etc.          Do ankle pump exercises when sitting in the chair             *To Prevent Pneumonia:           Sit up and take deep breaths several times a day and use the incentive spirometer     *Call the office if:           Increased redness, drainage or an opening at your incision, severe pain, new onset fever or chills. Also notify of any calf pain, tenderness, swelling or redness. Notify of any rash, nausea and vomiting             **If you have any questions or problems please call our office at 1 [phone]**   or contact Ortho Navigator at 1 [phone] Lazaro Gonsalves.      Thank you

## 2022-10-26 NOTE — DISCHARGE SUMMARY
[facility]  Dr. Bryant [place]  Discharge Summary       Valarie Myers is a 47 y.o. female underwent right total hip replacement procedure without complication. Valarie Myers was admitted to the floor following their recovery in the PACU.      Discharge Diagnosis  right Hip Replacement    Current Inpatient Medications    Current Facility-Administered Medications: azaTHIOprine (IMURAN) tablet 100 mg, 100 mg, Oral, Daily  LORazepam (ATIVAN) tablet 1 mg, 1 mg, Oral, Q6H PRN  triamterene-hydroCHLOROthiazide (MAXZIDE-25) 37.5-25 MG per tablet 1 tablet, 1 tablet, Oral, QAM  Norethin Ace-Eth Estrad-FE 1-20 MG-MCG(24) CAPS 1 capsule  (Patient Supplied), 1 capsule, Mouth/Throat, Daily  0.9 % sodium chloride infusion, , IntraVENous, Continuous  0.9 % sodium chloride bolus, 500 mL, IntraVENous, PRN  sodium chloride flush 0.9 % injection 5-40 mL, 5-40 mL, IntraVENous, 2 times per day  sodium chloride flush 0.9 % injection 5-40 mL, 5-40 mL, IntraVENous, PRN  0.9 % sodium chloride infusion, , IntraVENous, PRN  acetaminophen (TYLENOL) tablet 650 mg, 650 mg, Oral, Q6H  bisacodyl (DULCOLAX) EC tablet 5 mg, 5 mg, Oral, Daily  ondansetron (ZOFRAN-ODT) disintegrating tablet 4 mg, 4 mg, Oral, Q8H PRN **OR** ondansetron (ZOFRAN) injection 4 mg, 4 mg, IntraVENous, Q6H PRN  oxyCODONE (ROXICODONE) immediate release tablet 5 mg, 5 mg, Oral, Q4H PRN **OR** oxyCODONE (ROXICODONE) immediate release tablet 10 mg, 10 mg, Oral, Q4H PRN **OR** oxyCODONE (ROXICODONE) immediate release tablet 15 mg, 15 mg, Oral, Q4H PRN  HYDROmorphone HCl PF (DILAUDID) injection 0.25 mg, 0.25 mg, IntraVENous, Q3H PRN **OR** HYDROmorphone HCl PF (DILAUDID) injection 0.5 mg, 0.5 mg, IntraVENous, Q3H PRN **OR** HYDROmorphone HCl PF (DILAUDID) injection 1 mg, 1 mg, IntraVENous, Q3H PRN  traMADol (ULTRAM) tablet 100 mg, 100 mg, Oral, Q6H  rivaroxaban (XARELTO) tablet 10 mg, 10 mg, Oral, Daily  polyvinyl alcohol (LIQUIFILM TEARS) 1.4 % ophthalmic solution 1 drop, 1 drop, Both Eyes, PRN  mesalamine (LIALDA) EC tablet 1.2 g (Patient Supplied), 1.2 g, Oral, Daily with breakfast    Post-operatively the patients diet was advanced as tolerated and their incision was checked on POD #1. The incision is was clean, dry and intact with no signs of infection. The patient remained neurovascularly intact in the lower extremity and had intact pulses distally. Patients calf remained soft and showed no evidence of DVT. The patient was able to move her right leg and ankle/foot without any problems post-operatively. Physical therapy and occupational therapy were consulted and began working with the patient post-operatively. The patient progressed with PT/OT as would be expected and continued to improve through their stay. The patients pain was initially controlled with IV medications but we were able to transition to oral pain medications soon after arrival to the floor and their pain remained under good control through their hospital stay. From a medical standpoint the patient remained stable and continued to have the medicine team follow throughout their stay. Acute postoperative blood loss anemia after joint replacement being monitored with daily hemoglobin/hematocrit. The patients dressing was changed/incision was checked on day of d/c. The patient will be discharged at this time to home with home health per anterior hip protocol with their current diet restrictions and will continue to follow the hip precautions outlined to them by us and PT/OT. Condition on Discharge: Stable    Plan  Followup at scheduled appointment time (1 month post-op). Patient was instructed on the use of pain medications, the signs and symptoms of infection, and was given our number to call should they have any questions or concerns following discharge.

## 2022-10-26 NOTE — PROGRESS NOTES
Physical Therapy  Name: Lauren Gaona  MRN:  [id number]  Date of service:  10/26/2022     10/26/22 0912   Restrictions/Precautions   Restrictions/Precautions Fall Risk;Weight Bearing   Required Braces or Orthoses? No   Lower Extremity Weight Bearing Restrictions   Right Lower Extremity Weight Bearing Weight Bearing As Tolerated   Position Activity Restriction   Other position/activity restrictions ANT IVORY PRECAUTIONS   Subjective   Subjective Pt agreeable to therapy. Pain Assessment   Pain Assessment 0-10   Pain Level 4   Pain Location Hip   Pain Orientation Right   Pain Descriptors Aching; Sore   Functional Pain Assessment Activities are not prevented   Pain Type Acute pain;Surgical pain   Bed Mobility   Supine to Sit Supervision   Transfers   Sit to Stand Stand by assistance   Stand to Sit Stand by assistance   Ambulation   WB Status FWB   Ambulation   Device Rolling Walker   Assistance Stand by assistance   Quality of Gait step-thru pattern, adequate WB thru BLE   Gait Deviations None   Distance 70' x2   Stairs/Curb   Stairs? Yes   Stairs   # Steps  5  (x2 reps)   Stairs Height 4\"  (6\")   Rails Bilateral   Device No Device   Assistance Stand by assistance   Comment vc's for correct sequencing   Short Term Goals   Time Frame for Short Term Goals 14 DAYS   Short Term Goal 1 BED MOB INDEPENDENT   Short Term Goal 2 TRANSFERS MOD IND   Short Term Goal 3 ' RW SUPERVISION   Conditions Requiring Skilled Therapeutic Intervention   Body Structures, Functions, Activity Limitations Requiring Skilled Therapeutic Intervention Decreased functional mobility ; Decreased ADL status; Decreased ROM; Decreased strength;Decreased balance; Increased pain   Assessment Pt did well with overall mobility, able to perform step trng with SBA and minimal vc's for correct sequencing. Pt up to recliner with all needs in reach and breakfast set up following tx.    Activity Tolerance   Activity Tolerance Patient tolerated treatment well   PT Plan of Care   Wednesday X   Safety Devices   Type of Devices Call light within reach;Gait belt;Left in chair         Electronically signed by Nato Pettit PTA on 10/26/2022 at 9:14 AM

## 2022-10-26 NOTE — CONSULTS
Consult      CHIEF COMPLAINT:  right hip pain    Reason for Admission:  Right IVORY    History Obtained From:  patient, chart    HISTORY OF PRESENT ILLNESS:      The patient is a 47 y.o. female who was admitted to Dr. Garcia Class service and underwent a right IVORY. Her pain is controlled. No CP or SOA. No issues with PO intake. No N/V. No dysuria. No HA or dizziness. No fevers or recent illnesses. Past Medical History:        Diagnosis Date    Back disorder     bulging disk    Colitis     Connective tissue and disc stenosis of intervertebral foramina of lumbar region 2016    Hypertension     Localized swelling of both lower legs     Lumbar radiculopathy, right 2016     Past Surgical History:        Procedure Laterality Date    BREAST ENHANCEMENT SURGERY       SECTION      COLONOSCOPY      2020    DILATION AND CURETTAGE OF UTERUS      LUMBAR SPINE SURGERY Right 2016    RIGHT L5-S1 MICRODISCECTOMY  performed by Georgi Myrick MD at [address] Left     TOTAL HIP ARTHROPLASTY Right 10/24/2022    RIGHT TOTAL HIP REPLACEMENT performed by Keila Sierra MD at [facility] [facility]         Medications Prior to Admission:    Medications Prior to Admission: Norethin Ace-Eth Estrad-FE 1-20 MG-MCG(24) CAPS, TAKE 1 CAPSULE BY MOUTH EVERY DAY (Patient taking differently: Take 1 capsule by mouth daily TAKE 1 CAPSULE BY MOUTH EVERY DAY)  LORazepam (ATIVAN) 1 MG tablet, Take 1 mg by mouth every 6 hours as needed for Anxiety. triamterene-hydroCHLOROthiazide (DYAZIDE) 37.5-25 MG per capsule, Take 1 capsule by mouth every morning  mesalamine (LIALDA) 1.2 g EC tablet, Take 4,800 mg by mouth daily  azaTHIOprine 100 MG TABS, Take 100 mg by mouth daily    Allergies:  Patient has no known allergies. Social History:   TOBACCO:   reports that she has never smoked.  She has never used smokeless tobacco.  ETOH:   reports current alcohol use of about 2.0 standard drinks per week.  DRUGS:   reports no history of drug use. MARITAL STATUS:    OCCUPATION:  she is working  Patient currently lives with family       Family History:       Problem Relation Age of Onset    No Known Problems Mother     Heart Attack Father 59    High Blood Pressure Father     Heart Disease Father     Diabetes Father     Dementia Father     Crohn's Disease Sister     Breast Cancer Maternal Grandmother     Diabetes Paternal Grandmother      REVIEW OF SYSTEMS:  Constitutional: neg  CV: neg  Pulmonary: neg  GI: neg  : neg  Psych: neg  Neuro: neg  Skin: neg  MusculoSkeletal: neg  HEENT: neg  Joints: right hip pain  Vitals:  /63   Pulse 70   Temp 97.9 °F (36.6 °C) (Temporal)   Resp 16   Ht 5' 10\" (1.778 m)   Wt 190 lb (86.2 kg)   LMP 10/15/2022   SpO2 97%   BMI 27.26 kg/m²     PHYSICAL EXAM:  Gen: NAD, alert, pleasant  HEENT: WNL  Lymph: no LAD  Neck: no JVD or masses  Chest: CTA bilat  CV: RRR  Abdomen: NT/ND  Extrem: no C/C/E  Neuro: non focal  Skin: no rashes  Joints: no redness  DATA:  I have reviewed the admission labs and imaging tests.     ASSESSMENT AND PLAN:      Principal Problem:    Primary osteoarthritis of right hip, S/P Right IVORY---follow with Ortho, continue pain treatment, therapy     ABL Anemia    HTN---follow BP    UC---stable with treatment      Particninoska Jaimes MD  6:36 AM 10/26/2022

## 2022-10-26 NOTE — PROGRESS NOTES
CLINICAL PHARMACY NOTE: MEDS TO BEDS    Total # of Prescriptions Filled: 5   The following medications were delivered to the patient:  Aspirin 81 mg  Doxycycline 100 mg  Ondansetron 4 mg  Oxycodone 5 mg  Eliquis 2.5     Additional Documentation:   Delivered Rx's to patients room. Gave Rx's to patients spouse Warren Cervantesry. Warren Morelos paid $20.06 copay with credit card.

## 2022-10-26 NOTE — PROGRESS NOTES
Patient discharged home today with [facility]. Facility notified of patient's discharge and all documents were faxed. P ; F .    Electronically signed by Jeffry Mays RN on 10/26/2022 at 10:18 AM

## 2022-10-27 ENCOUNTER — TELEPHONE (OUTPATIENT)
Dept: INPATIENT UNIT | Age: 52
End: 2022-10-27

## 2022-10-27 NOTE — PROGRESS NOTES
Progress Note  Dennis Wren  10/26/2022 10:25 PM  Subjective:   Admit Date:   10/24/2022      CC/ADMIT DX:       Interval History:   Reviewed overnight events and nursing notes. She has no c/o CP or SOA. Her pain is controlled. I have reviewed all labs/diagnostics from the last 24hrs. ROS:   I have done a 10 point ROS and all are negative, except what is mentioned in the HPI. No diet orders on file    Medications:             Objective:   Vitals: BP (!) 97/59   Pulse 65   Temp 98.4 °F (36.9 °C) (Temporal)   Resp 16   Ht 5' 10\" (1.778 m)   Wt 190 lb (86.2 kg)   LMP 10/15/2022   SpO2 97%   BMI 27.26 kg/m²    Intake/Output Summary (Last 24 hours) at 10/26/2022 2225  Last data filed at 10/26/2022 3775  Gross per 24 hour   Intake 460 ml   Output 1000 ml   Net -540 ml     General appearance: alert and cooperative with exam  Lungs: clear to auscultation bilaterally  Heart: RRR  Abdomen: soft, non-tender; bowel sounds normal; no masses,  no organomegaly  Extremities: extremities normal, atraumatic, no cyanosis or edema  Neurologic:  No obvious focal neurologic deficits. Assessment and Plan:   Principal Problem:    Primary osteoarthritis of right hip  Resolved Problems:    * No resolved hospital problems. *    ABL anemia    Plan:   Continue present medication(s)    Follow with [address] [place] [address] for d/c home with Home Health      Discharge planning:    home    Reviewed treatment plans with the patient and/or family.              Electronically signed by Sony Mahoney MD on 10/26/2022 at 10:25 PM

## 2022-10-31 LAB
ALBUMIN SERPL-MCNC: 3.4 G/DL (ref 3.5–5.2)
ALP BLD-CCNC: 71 U/L (ref 35–104)
ALT SERPL-CCNC: 19 U/L (ref 5–33)
ANION GAP SERPL CALCULATED.3IONS-SCNC: 13 MMOL/L (ref 7–19)
AST SERPL-CCNC: 21 U/L (ref 5–32)
BASOPHILS ABSOLUTE: 0 K/UL (ref 0–0.2)
BASOPHILS RELATIVE PERCENT: 0.3 % (ref 0–1)
BILIRUB SERPL-MCNC: 0.4 MG/DL (ref 0.2–1.2)
BUN BLDV-MCNC: 15 MG/DL (ref 6–20)
CALCIUM SERPL-MCNC: 9.2 MG/DL (ref 8.6–10)
CHLORIDE BLD-SCNC: 99 MMOL/L (ref 98–111)
CO2: 26 MMOL/L (ref 22–29)
CREAT SERPL-MCNC: 0.8 MG/DL (ref 0.5–0.9)
EOSINOPHILS ABSOLUTE: 0.1 K/UL (ref 0–0.6)
EOSINOPHILS RELATIVE PERCENT: 0.8 % (ref 0–5)
GFR SERPL CREATININE-BSD FRML MDRD: >60 ML/MIN/{1.73_M2}
GLUCOSE BLD-MCNC: 124 MG/DL (ref 74–109)
HCT VFR BLD CALC: 34.4 % (ref 37–47)
HEMOGLOBIN: 11.5 G/DL (ref 12–16)
IMMATURE GRANULOCYTES #: 0 K/UL
LYMPHOCYTES ABSOLUTE: 1.2 K/UL (ref 1.1–4.5)
LYMPHOCYTES RELATIVE PERCENT: 14.7 % (ref 20–40)
MCH RBC QN AUTO: 35.5 PG (ref 27–31)
MCHC RBC AUTO-ENTMCNC: 33.4 G/DL (ref 33–37)
MCV RBC AUTO: 106.2 FL (ref 81–99)
MONOCYTES ABSOLUTE: 0.6 K/UL (ref 0–0.9)
MONOCYTES RELATIVE PERCENT: 8.2 % (ref 0–10)
NEUTROPHILS ABSOLUTE: 5.9 K/UL (ref 1.5–7.5)
NEUTROPHILS RELATIVE PERCENT: 75.5 % (ref 50–65)
PDW BLD-RTO: 12.2 % (ref 11.5–14.5)
PLATELET # BLD: 408 K/UL (ref 130–400)
PMV BLD AUTO: 10 FL (ref 9.4–12.3)
POTASSIUM SERPL-SCNC: 4.3 MMOL/L (ref 3.5–5)
RBC # BLD: 3.24 M/UL (ref 4.2–5.4)
SODIUM BLD-SCNC: 138 MMOL/L (ref 136–145)
TOTAL PROTEIN: 5.9 G/DL (ref 6.6–8.7)
WBC # BLD: 7.8 K/UL (ref 4.8–10.8)

## 2023-05-26 ENCOUNTER — OFFICE VISIT (OUTPATIENT)
Dept: OBGYN CLINIC | Age: 53
End: 2023-05-26
Payer: COMMERCIAL

## 2023-05-26 VITALS
SYSTOLIC BLOOD PRESSURE: 150 MMHG | BODY MASS INDEX: 28.2 KG/M2 | DIASTOLIC BLOOD PRESSURE: 90 MMHG | HEIGHT: 70 IN | WEIGHT: 197 LBS | HEART RATE: 65 BPM

## 2023-05-26 DIAGNOSIS — Z01.419 ENCOUNTER FOR GYNECOLOGICAL EXAMINATION WITHOUT ABNORMAL FINDING: Primary | ICD-10-CM

## 2023-05-26 DIAGNOSIS — Z12.31 ENCOUNTER FOR SCREENING MAMMOGRAM FOR MALIGNANT NEOPLASM OF BREAST: ICD-10-CM

## 2023-05-26 DIAGNOSIS — Z11.51 SCREENING FOR HPV (HUMAN PAPILLOMAVIRUS): ICD-10-CM

## 2023-05-26 DIAGNOSIS — Z12.39 ENCOUNTER FOR SCREENING BREAST EXAMINATION: ICD-10-CM

## 2023-05-26 DIAGNOSIS — Z12.4 SCREENING FOR CERVICAL CANCER: ICD-10-CM

## 2023-05-26 LAB — HPV16+18+H RISK 12 DNA SPEC-IMP: NORMAL

## 2023-05-26 PROCEDURE — 99396 PREV VISIT EST AGE 40-64: CPT | Performed by: NURSE PRACTITIONER

## 2023-05-26 ASSESSMENT — ENCOUNTER SYMPTOMS
CONSTIPATION: 0
RESPIRATORY NEGATIVE: 1
EYES NEGATIVE: 1
ALLERGIC/IMMUNOLOGIC NEGATIVE: 1
GASTROINTESTINAL NEGATIVE: 1
DIARRHEA: 0

## 2023-05-26 NOTE — PROGRESS NOTES
Mona Smith is a 47 y.o. female who presents today for her medical conditions/ complaints as noted below. Mona Smith is c/o of Annual Exam        HPI  Pt presents for annual exam and pap smear. Stopped OCP in January and had a one day period. No bleeding since then and having some hot flashes and night sweats. Taking Sonda Cuna and has been helping. Teraful regarding weight gain. Has been walking more and working out at the gym twice a week. Mammo:  Pap smear:  Contraception: NA    P:2  Ab:2  Bone density:NA   Colonoscopy:  Patient's last menstrual period was 2023 (exact date). [id number]    Past Medical History:   Diagnosis Date    Back disorder     bulging disk    Colitis     Connective tissue and disc stenosis of intervertebral foramina of lumbar region 2016    Hypertension     Localized swelling of both lower legs     Lumbar radiculopathy, right 2016     Past Surgical History:   Procedure Laterality Date    BREAST ENHANCEMENT SURGERY       SECTION      COLONOSCOPY      2020    DILATION AND CURETTAGE OF UTERUS      LUMBAR SPINE SURGERY Right 2016    RIGHT L5-S1 MICRODISCECTOMY  performed by Gina Henriquez MD at [address] Left     TOTAL HIP ARTHROPLASTY Right 10/24/2022    RIGHT TOTAL HIP REPLACEMENT performed by Chica Soto MD at [address]     Family History   Problem Relation Age of Onset    No Known Problems Mother     Heart Attack Father 59    High Blood Pressure Father     Heart Disease Father     Diabetes Father     Dementia Father     Crohn's Disease Sister     Breast Cancer Maternal Grandmother     Diabetes Paternal Grandmother      Social History     Tobacco Use    Smoking status: Never    Smokeless tobacco: Never   Substance Use Topics    Alcohol use:  Yes     Alcohol/week: 2.0 standard drinks     Types: 2 Glasses of wine per week     Comment: daily       Current Outpatient Medications

## 2023-05-26 NOTE — PROGRESS NOTES
Pt presents today for pap smear and breast exam. She is wanting to discuss menopause.      Mammo:  Pap smear:  Contraception:    P:2  Ab:2  Bone density:NA   Colonoscopy:

## 2023-06-01 LAB
HPV HR 12 DNA SPEC QL NAA+PROBE: NOT DETECTED
HPV16 DNA SPEC QL NAA+PROBE: NOT DETECTED
HPV16+18+H RISK 12 DNA SPEC-IMP: NORMAL
HPV18 DNA SPEC QL NAA+PROBE: NOT DETECTED

## 2023-07-28 ENCOUNTER — HOSPITAL ENCOUNTER (OUTPATIENT)
Dept: MAMMOGRAPHY | Facility: HOSPITAL | Age: 53
Discharge: HOME | End: 2023-07-28
Admitting: NURSE PRACTITIONER
Payer: COMMERCIAL

## 2023-07-28 DIAGNOSIS — Z12.31 ENCOUNTER FOR SCREENING MAMMOGRAM FOR MALIGNANT NEOPLASM OF BREAST: ICD-10-CM

## 2023-07-28 PROCEDURE — 77067 SCR MAMMO BI INCL CAD: CPT

## 2023-07-28 PROCEDURE — 77063 BREAST TOMOSYNTHESIS BI: CPT

## 2023-08-25 NOTE — PROGRESS NOTES
Chief Complaint   Patient presents with    Med Refill     Pt presents today for yearly OV for Crohn's-needs refills on Lialda and Imuran; Pt states she is feeling good        PCP: Junior Swanson MD  REFER: No ref. provider found      Subjective   HPI    Dulce Maria Bernal is a 53 y.o. female who presents with a history of Crohn's Disease.      She denies  abdominal cramping, diarrhea, bloody stool, and anorexia.  Bowels move once per day.  She has lost 30 lb on medical supervised diet.  Previous diagnostic test include  colonoscopy.    PREVIOUS PROCEDURES:      COLONOSCOPY (06/05/2020 08:53)-normal (5 years)  COLONOSCOPY (05/25/2017 09:43)  SCANNED - COLONOSCOPY (06/13/2014 00:00)  SCANNED - COLONOSCOPY (07/01/2011 00:00)  SCANNED - COLONOSCOPY (08/08/2008 00:00)      Past Medical History:   Diagnosis Date    Crohn's disease     Ulcerative colitis     Ulcerative proctitis      Outpatient Medications Marked as Taking for the 8/28/23 encounter (Office Visit) with Harvinder Jeffers APRN   Medication Sig Dispense Refill    azaTHIOprine (Imuran) 50 MG tablet Take 2 tablets by mouth Daily. 180 tablet 3    LORazepam (ATIVAN) 1 MG tablet Take 1 tablet by mouth As Needed.      mesalamine (LIALDA) 1.2 g EC tablet Take 1 tablet by mouth Daily With Breakfast. 90 tablet 3    phentermine 15 MG capsule Take 1 capsule by mouth Daily.      triamterene-hydrochlorothiazide (DYAZIDE) 37.5-25 MG per capsule Take 1 capsule by mouth Every Morning.  5    [DISCONTINUED] azaTHIOprine (Imuran) 50 MG tablet Take 2 tablets by mouth Daily. 180 tablet 3    [DISCONTINUED] mesalamine (LIALDA) 1.2 g EC tablet Take 1 tablet by mouth Daily With Breakfast. 90 tablet 3     No Known Allergies  Social History     Socioeconomic History    Marital status:    Tobacco Use    Smoking status: Never    Smokeless tobacco: Never   Vaping Use    Vaping Use: Never used   Substance and Sexual Activity    Alcohol use: Yes     Comment: LITTLE BIT DAILY     Drug use: No    Sexual activity: Defer     Family History   Problem Relation Age of Onset    Breast cancer Maternal Grandmother     Crohn's disease Sister     No Known Problems Mother     No Known Problems Father     No Known Problems Brother     No Known Problems Daughter     No Known Problems Son     No Known Problems Paternal Grandmother     No Known Problems Maternal Aunt     No Known Problems Paternal Aunt     Colon cancer Neg Hx     Colon polyps Neg Hx     Esophageal cancer Neg Hx     Liver cancer Neg Hx     Liver disease Neg Hx     Rectal cancer Neg Hx     Stomach cancer Neg Hx     BRCA 1/2 Neg Hx     Endometrial cancer Neg Hx     Ovarian cancer Neg Hx      Review of Systems  Objective   Vitals:    08/28/23 1410   BP: 108/70   Pulse: 72   Temp: 98.2 øF (36.8 øC)   SpO2: 99%     Physical Exam  Constitutional:       Appearance: Normal appearance. She is well-developed.   Eyes:      General: No scleral icterus.  Cardiovascular:      Heart sounds: Normal heart sounds. No murmur heard.  Pulmonary:      Effort: Pulmonary effort is normal.   Abdominal:      General: Bowel sounds are normal. There is no distension.      Palpations: Abdomen is soft.      Tenderness: There is no abdominal tenderness. There is no guarding.   Skin:     General: Skin is warm and dry.      Coloration: Skin is not jaundiced.   Neurological:      Mental Status: She is alert.   Psychiatric:         Behavior: Behavior is cooperative.     Imaging Results (Most Recent)       None          Body mass index is 24.68 kg/mý.  Assessment & Plan   Diagnoses and all orders for this visit:    1. Crohn's disease without complication, unspecified gastrointestinal tract location (Primary)    Other orders  -     mesalamine (LIALDA) 1.2 g EC tablet; Take 1 tablet by mouth Daily With Breakfast.  Dispense: 90 tablet; Refill: 3  -     azaTHIOprine (Imuran) 50 MG tablet; Take 2 tablets by mouth Daily.  Dispense: 180 tablet; Refill: 3          * Surgery not found  *    Colonoscopy is up to date.  She is tolerating imuran and lialda without difficulty.  She has had labs by pcp earlier this year and through Dr Cummings office in June.  I will request labs from Dr Cummings office.  I recommend she continue imuran and lialda as currently prescribed.     Harvinder Jeffers, APRN  08/28/23

## 2023-08-28 ENCOUNTER — OFFICE VISIT (OUTPATIENT)
Dept: GASTROENTEROLOGY | Facility: CLINIC | Age: 53
End: 2023-08-28
Payer: COMMERCIAL

## 2023-08-28 ENCOUNTER — TELEPHONE (OUTPATIENT)
Dept: GASTROENTEROLOGY | Facility: CLINIC | Age: 53
End: 2023-08-28

## 2023-08-28 VITALS
BODY MASS INDEX: 24.62 KG/M2 | OXYGEN SATURATION: 99 % | SYSTOLIC BLOOD PRESSURE: 108 MMHG | WEIGHT: 172 LBS | DIASTOLIC BLOOD PRESSURE: 70 MMHG | HEIGHT: 70 IN | HEART RATE: 72 BPM | TEMPERATURE: 98.2 F

## 2023-08-28 DIAGNOSIS — K50.90 CROHN'S DISEASE WITHOUT COMPLICATION, UNSPECIFIED GASTROINTESTINAL TRACT LOCATION: Primary | ICD-10-CM

## 2023-08-28 PROCEDURE — 99213 OFFICE O/P EST LOW 20 MIN: CPT | Performed by: NURSE PRACTITIONER

## 2023-08-28 RX ORDER — LORAZEPAM 1 MG/1
1 TABLET ORAL AS NEEDED
COMMUNITY

## 2023-08-28 RX ORDER — AZATHIOPRINE 50 MG/1
100 TABLET ORAL DAILY
Qty: 180 TABLET | Refills: 3 | Status: SHIPPED | OUTPATIENT
Start: 2023-08-28

## 2023-08-28 RX ORDER — PHENTERMINE HYDROCHLORIDE 15 MG/1
15 CAPSULE ORAL DAILY
COMMUNITY

## 2023-08-28 RX ORDER — MESALAMINE 1.2 G/1
1200 TABLET, DELAYED RELEASE ORAL
Qty: 90 TABLET | Refills: 3 | Status: SHIPPED | OUTPATIENT
Start: 2023-08-28

## 2023-10-23 RX ORDER — AZATHIOPRINE 50 MG/1
100 TABLET ORAL DAILY
Qty: 180 TABLET | Refills: 3 | Status: SHIPPED | OUTPATIENT
Start: 2023-10-23

## 2024-05-28 ASSESSMENT — PATIENT HEALTH QUESTIONNAIRE - PHQ9
SUM OF ALL RESPONSES TO PHQ QUESTIONS 1-9: 1
SUM OF ALL RESPONSES TO PHQ QUESTIONS 1-9: 1
SUM OF ALL RESPONSES TO PHQ9 QUESTIONS 1 & 2: 1
SUM OF ALL RESPONSES TO PHQ9 QUESTIONS 1 & 2: 1
1. LITTLE INTEREST OR PLEASURE IN DOING THINGS: SEVERAL DAYS
SUM OF ALL RESPONSES TO PHQ QUESTIONS 1-9: 1
2. FEELING DOWN, DEPRESSED OR HOPELESS: NOT AT ALL
2. FEELING DOWN, DEPRESSED OR HOPELESS: NOT AT ALL
1. LITTLE INTEREST OR PLEASURE IN DOING THINGS: SEVERAL DAYS
SUM OF ALL RESPONSES TO PHQ QUESTIONS 1-9: 1

## 2024-05-31 ENCOUNTER — OFFICE VISIT (OUTPATIENT)
Dept: OBGYN CLINIC | Age: 54
End: 2024-05-31
Payer: COMMERCIAL

## 2024-05-31 VITALS
BODY MASS INDEX: 24.18 KG/M2 | SYSTOLIC BLOOD PRESSURE: 135 MMHG | DIASTOLIC BLOOD PRESSURE: 85 MMHG | HEART RATE: 67 BPM | WEIGHT: 168.5 LBS

## 2024-05-31 DIAGNOSIS — Z12.31 ENCOUNTER FOR SCREENING MAMMOGRAM FOR MALIGNANT NEOPLASM OF BREAST: ICD-10-CM

## 2024-05-31 DIAGNOSIS — Z11.51 SCREENING FOR HPV (HUMAN PAPILLOMAVIRUS): ICD-10-CM

## 2024-05-31 DIAGNOSIS — Z01.419 ENCOUNTER FOR GYNECOLOGICAL EXAMINATION WITHOUT ABNORMAL FINDING: Primary | ICD-10-CM

## 2024-05-31 DIAGNOSIS — Z12.39 ENCOUNTER FOR SCREENING BREAST EXAMINATION: ICD-10-CM

## 2024-05-31 DIAGNOSIS — Z12.4 SCREENING FOR CERVICAL CANCER: ICD-10-CM

## 2024-05-31 LAB — HPV16+18+H RISK 12 DNA SPEC-IMP: NORMAL

## 2024-05-31 PROCEDURE — 99396 PREV VISIT EST AGE 40-64: CPT | Performed by: NURSE PRACTITIONER

## 2024-05-31 RX ORDER — PHENTERMINE HYDROCHLORIDE 37.5 MG/1
37.5 CAPSULE ORAL EVERY MORNING
COMMUNITY

## 2024-05-31 ASSESSMENT — ENCOUNTER SYMPTOMS
CONSTIPATION: 0
DIARRHEA: 0
GASTROINTESTINAL NEGATIVE: 1
RESPIRATORY NEGATIVE: 1
ALLERGIC/IMMUNOLOGIC NEGATIVE: 1
EYES NEGATIVE: 1

## 2024-05-31 NOTE — PROGRESS NOTES
Felisa Almanza is a 53 y.o. female who presents today for her medical conditions/ complaints as noted below. Felisa Almanza is c/o of Annual Exam        HPI  Pt presents for well woman exam and pap smear. Needs order for screening mammogram. Having irregular periods. Denies any problems with hot flashes or night sweats. Has lost about 30lbs with Dr Means office and plan. Works out with weight training and cardio. Taking women's multivitamin.     Mammo:2023  Pap smear:  Contraception:Perimenopausal     P:2  Ab:2  Bone density:NA  Colonoscopy:  Patient's last menstrual period was 2024.      Past Medical History:   Diagnosis Date    Back disorder     bulging disk    Colitis     Connective tissue and disc stenosis of intervertebral foramina of lumbar region 2016    Hypertension     Localized swelling of both lower legs     Lumbar radiculopathy, right 2016     Past Surgical History:   Procedure Laterality Date    BREAST ENHANCEMENT SURGERY       SECTION      COLONOSCOPY      2020    DILATION AND CURETTAGE OF UTERUS      LUMBAR SPINE SURGERY Right 2016    RIGHT L5-S1 MICRODISCECTOMY  performed by Darshan Carrero MD at [facility] OR    MICRODISCECTOMY LUMBAR      TOTAL HIP ARTHROPLASTY Left     TOTAL HIP ARTHROPLASTY Right 10/24/2022    RIGHT TOTAL HIP REPLACEMENT performed by John Antunez MD at [facility] OR     Family History   Problem Relation Age of Onset    No Known Problems Mother     Heart Attack Father 64    High Blood Pressure Father     Heart Disease Father     Diabetes Father     Dementia Father     Crohn's Disease Sister     Breast Cancer Maternal Grandmother     Diabetes Paternal Grandmother      Social History     Tobacco Use    Smoking status: Never    Smokeless tobacco: Never   Substance Use Topics    Alcohol use: Yes     Alcohol/week: 2.0 standard drinks of alcohol     Types: 2 Glasses of wine per week     Comment: daily       Current Outpatient

## 2024-05-31 NOTE — PROGRESS NOTES
Pt presents today for pap smear and breast exam.      Mammo:2023  Pap smear:  Contraception:Perimenopausal     P:2  Ab:2  Bone density:NA  Colonoscopy:

## 2024-06-10 ENCOUNTER — TRANSCRIBE ORDERS (OUTPATIENT)
Dept: ADMINISTRATIVE | Facility: HOSPITAL | Age: 54
End: 2024-06-10
Payer: COMMERCIAL

## 2024-06-10 DIAGNOSIS — Z12.31 ENCOUNTER FOR SCREENING MAMMOGRAM FOR MALIGNANT NEOPLASM OF BREAST: Primary | ICD-10-CM

## 2024-07-10 LAB
NCCN CRITERIA FLAG: NORMAL
TYRER CUZICK SCORE: 15.5

## 2024-08-09 ENCOUNTER — HOSPITAL ENCOUNTER (OUTPATIENT)
Dept: MAMMOGRAPHY | Facility: HOSPITAL | Age: 54
Discharge: HOME | End: 2024-08-09
Admitting: NURSE PRACTITIONER
Payer: COMMERCIAL

## 2024-08-09 DIAGNOSIS — Z12.31 ENCOUNTER FOR SCREENING MAMMOGRAM FOR MALIGNANT NEOPLASM OF BREAST: ICD-10-CM

## 2024-08-09 PROCEDURE — 77063 BREAST TOMOSYNTHESIS BI: CPT

## 2024-08-09 PROCEDURE — 77067 SCR MAMMO BI INCL CAD: CPT

## 2024-08-12 DIAGNOSIS — Z12.31 ENCOUNTER FOR SCREENING MAMMOGRAM FOR MALIGNANT NEOPLASM OF BREAST: ICD-10-CM

## 2024-08-21 ENCOUNTER — TELEPHONE (OUTPATIENT)
Dept: GASTROENTEROLOGY | Facility: CLINIC | Age: 54
End: 2024-08-21
Payer: COMMERCIAL

## 2024-08-21 RX ORDER — MESALAMINE 1.2 G/1
1.2 TABLET, DELAYED RELEASE ORAL
Qty: 90 TABLET | Refills: 3 | OUTPATIENT
Start: 2024-08-21

## 2024-08-21 NOTE — TELEPHONE ENCOUNTER
"Caller: Dulce Maria Bernal \"Anne\"    Relationship: Self    Best call back number: [phone]    Requested Prescriptions:   Requested Prescriptions     Refused Prescriptions Disp Refills    mesalamine (LIALDA) 1.2 g EC tablet [Pharmacy Med Name: MESALAMINE TAB 1.2GM ] 90 tablet 3     Sig: TAKE 1 TABLET DAILY WITH   BREAKFAST     Refused By: EVERETT TINEO     Reason for Refusal: Patient needs an appointment        Pharmacy where request should be sent: [facility] - [place] [address] AT PORTAL TO REGISTERED [facility] - [phone]  - [phone] FX     Last office visit with prescribing clinician: 8/28/2023   Last telemedicine visit with prescribing clinician: Visit date not found   Next office visit with prescribing clinician: 10/2/2024     Additional details provided by patient: PT IS NEEDING ENOUGH UNTIL HERE APPT ON 10/2/2024. PT HAS ABOUT A WEEK LEFT.     Does the patient have less than a 3 day supply:  [] Yes  [x] No    Would you like a call back once the refill request has been completed: [x] Yes [] No    If the office needs to give you a call back, can they leave a voicemail: [x] Yes [] No    Onel Saleh   08/21/24 12:48 CDT         "

## 2024-08-22 ENCOUNTER — TELEPHONE (OUTPATIENT)
Dept: GASTROENTEROLOGY | Facility: CLINIC | Age: 54
End: 2024-08-22
Payer: COMMERCIAL

## 2024-08-22 NOTE — TELEPHONE ENCOUNTER
Called into [facility] [phone] mesalamine (lialda)1.2 g qd #90 0 refills. Has office visit.

## 2024-10-02 ENCOUNTER — TELEPHONE (OUTPATIENT)
Dept: GASTROENTEROLOGY | Facility: CLINIC | Age: 54
End: 2024-10-02

## 2024-10-02 ENCOUNTER — OFFICE VISIT (OUTPATIENT)
Dept: GASTROENTEROLOGY | Facility: CLINIC | Age: 54
End: 2024-10-02
Payer: COMMERCIAL

## 2024-10-02 VITALS
TEMPERATURE: 98.3 F | OXYGEN SATURATION: 98 % | DIASTOLIC BLOOD PRESSURE: 70 MMHG | HEIGHT: 70 IN | SYSTOLIC BLOOD PRESSURE: 126 MMHG | WEIGHT: 172 LBS | BODY MASS INDEX: 24.62 KG/M2 | HEART RATE: 68 BPM

## 2024-10-02 DIAGNOSIS — K50.90 CROHN'S DISEASE WITHOUT COMPLICATION, UNSPECIFIED GASTROINTESTINAL TRACT LOCATION: Primary | ICD-10-CM

## 2024-10-02 PROCEDURE — 99213 OFFICE O/P EST LOW 20 MIN: CPT | Performed by: NURSE PRACTITIONER

## 2024-10-02 RX ORDER — AZATHIOPRINE 50 MG/1
100 TABLET ORAL DAILY
Qty: 180 TABLET | Refills: 3 | Status: SHIPPED | OUTPATIENT
Start: 2024-10-02

## 2024-10-02 RX ORDER — MESALAMINE 1.2 G/1
1200 TABLET, DELAYED RELEASE ORAL
Qty: 90 TABLET | Refills: 3 | Status: SHIPPED | OUTPATIENT
Start: 2024-10-02

## 2024-10-02 RX ORDER — TOPIRAMATE 25 MG/1
25 TABLET, FILM COATED ORAL 2 TIMES DAILY
COMMUNITY

## 2024-10-02 NOTE — PROGRESS NOTES
Chief Complaint   Patient presents with    Crohn's Disease     Needs meds refilled imuran and lialda mail order       PCP: Junior Swanson MD  REFER: No ref. provider found      Subjective   HPI    Dulce Maria Bernal is a 54 y.o. female who presents with a history of Crohn's Disease.      She denies  abdominal cramping, diarrhea, bloody stool, and anorexia.  Last colonoscopy 2020.  Bowels vary daily.  She attributes this to her diet (protein/carb intake).  When she feels more constipated she will utilize miralax with relief.         COLONOSCOPY (06/05/2020 08:53)-normal (5 years)  COLONOSCOPY (05/25/2017 09:43)  SCANNED - COLONOSCOPY (06/13/2014 00:00)  SCANNED - COLONOSCOPY (07/01/2011 00:00)  SCANNED - COLONOSCOPY (08/08/2008 00:00)    Past Medical History:   Diagnosis Date    Crohn's disease     Ulcerative colitis     Ulcerative proctitis      Outpatient Medications Marked as Taking for the 10/2/24 encounter (Office Visit) with Harvinder Jeffers APRN   Medication Sig Dispense Refill    azaTHIOprine (IMURAN) 50 MG tablet Take 2 tablets by mouth Daily. 180 tablet 3    LORazepam (ATIVAN) 1 MG tablet Take 1 tablet by mouth As Needed.      mesalamine (LIALDA) 1.2 g EC tablet Take 1 tablet by mouth Daily With Breakfast. 90 tablet 3    phentermine 15 MG capsule Take 1 capsule by mouth Daily.      topiramate (TOPAMAX) 25 MG tablet Take 1 tablet by mouth 2 (Two) Times a Day.      triamterene-hydrochlorothiazide (DYAZIDE) 37.5-25 MG per capsule Take 1 capsule by mouth Every Morning.  5    [DISCONTINUED] azaTHIOprine (IMURAN) 50 MG tablet TAKE 2 TABLETS DAILY 180 tablet 3    [DISCONTINUED] mesalamine (LIALDA) 1.2 g EC tablet Take 1 tablet by mouth Daily With Breakfast. 90 tablet 3     No Known Allergies  Social History     Socioeconomic History    Marital status:    Tobacco Use    Smoking status: Never    Smokeless tobacco: Never   Vaping Use    Vaping status: Never Used   Substance and Sexual Activity     Alcohol use: Yes     Comment: LITTLE BIT DAILY    Drug use: No    Sexual activity: Defer     Family History   Problem Relation Age of Onset    Breast cancer Maternal Grandmother     Crohn's disease Sister     No Known Problems Mother     No Known Problems Father     No Known Problems Brother     No Known Problems Daughter     No Known Problems Son     No Known Problems Paternal Grandmother     No Known Problems Maternal Aunt     No Known Problems Paternal Aunt     Colon cancer Neg Hx     Colon polyps Neg Hx     Esophageal cancer Neg Hx     Liver cancer Neg Hx     Liver disease Neg Hx     Rectal cancer Neg Hx     Stomach cancer Neg Hx     BRCA 1/2 Neg Hx     Endometrial cancer Neg Hx     Ovarian cancer Neg Hx      Review of Systems   Constitutional:  Negative for fever and unexpected weight change.   HENT:  Negative for trouble swallowing.    Respiratory:  Negative for shortness of breath.    Cardiovascular:  Negative for chest pain.   Gastrointestinal:  Negative for abdominal pain and anal bleeding.     Objective   Vitals:    10/02/24 0916   BP: 126/70   Pulse: 68   Temp: 98.3 °F (36.8 °C)   SpO2: 98%     Physical Exam  Constitutional:       Appearance: Normal appearance. She is well-developed.   Eyes:      General: No scleral icterus.  Cardiovascular:      Heart sounds: Normal heart sounds. No murmur heard.  Pulmonary:      Effort: Pulmonary effort is normal.   Abdominal:      General: Bowel sounds are normal. There is no distension.      Palpations: Abdomen is soft.      Tenderness: There is no abdominal tenderness. There is no guarding.   Skin:     General: Skin is warm and dry.      Coloration: Skin is not jaundiced.   Neurological:      Mental Status: She is alert.   Psychiatric:         Behavior: Behavior is cooperative.       Imaging Results (Most Recent)       None          Body mass index is 24.68 kg/m².  Assessment & Plan   Diagnoses and all orders for this visit:    1. Crohn's disease without  complication, unspecified gastrointestinal tract location (Primary)    Other orders  -     mesalamine (LIALDA) 1.2 g EC tablet; Take 1 tablet by mouth Daily With Breakfast.  Dispense: 90 tablet; Refill: 3  -     azaTHIOprine (IMURAN) 50 MG tablet; Take 2 tablets by mouth Daily.  Dispense: 180 tablet; Refill: 3          * Surgery not found *    Colonoscopy is up to date       Dulce Maria Bernal is tolerating imuran and lialda therapy without difficulty.  Symptoms are controlled with current PPI medication and dose.  I will go ahead and refill imuran and Lialda.  Dulce Maria Bernal understands adjustments may be made pending review of requested lab work.     Recommend patient have baseline DEXA scan, then repeated every 1-2 years.  I have encouraged patient to discuss this with PCP  It has been advised patient avoid use of NSAIDs due to risk associated with a flare  Encourage females to stay up to date with PAP Smear due to risk of cervical dysplasia associated with use of biologics and immunomodulator therapy        Harvinder Jeffers, APRN  10/02/24

## 2024-10-02 NOTE — TELEPHONE ENCOUNTER
Can you please obtain most recent cbc and cmp, please    If Junior Swanson MD doesn't have them Dr Cummings may    Thank you

## 2024-12-26 RX ORDER — AZATHIOPRINE 50 MG/1
100 TABLET ORAL DAILY
Qty: 180 TABLET | Refills: 3 | OUTPATIENT
Start: 2024-12-26

## 2025-06-06 ENCOUNTER — OFFICE VISIT (OUTPATIENT)
Dept: OBGYN CLINIC | Age: 55
End: 2025-06-06
Payer: COMMERCIAL

## 2025-06-06 VITALS
SYSTOLIC BLOOD PRESSURE: 143 MMHG | DIASTOLIC BLOOD PRESSURE: 86 MMHG | BODY MASS INDEX: 26.69 KG/M2 | WEIGHT: 186 LBS | HEART RATE: 62 BPM

## 2025-06-06 DIAGNOSIS — Z11.51 SCREENING FOR HPV (HUMAN PAPILLOMAVIRUS): ICD-10-CM

## 2025-06-06 DIAGNOSIS — Z12.31 ENCOUNTER FOR SCREENING MAMMOGRAM FOR MALIGNANT NEOPLASM OF BREAST: ICD-10-CM

## 2025-06-06 DIAGNOSIS — Z01.419 WELL WOMAN EXAM WITH ROUTINE GYNECOLOGICAL EXAM: Primary | ICD-10-CM

## 2025-06-06 DIAGNOSIS — Z12.39 ENCOUNTER FOR SCREENING BREAST EXAMINATION: ICD-10-CM

## 2025-06-06 DIAGNOSIS — Z12.4 SCREENING FOR CERVICAL CANCER: ICD-10-CM

## 2025-06-06 LAB — HPV16+18+H RISK 12 DNA SPEC-IMP: NORMAL

## 2025-06-06 PROCEDURE — 99396 PREV VISIT EST AGE 40-64: CPT | Performed by: NURSE PRACTITIONER

## 2025-06-06 RX ORDER — ESTRADIOL 0.05 MG/D
1 PATCH, EXTENDED RELEASE TRANSDERMAL
Qty: 8 PATCH | Refills: 3 | Status: SHIPPED | OUTPATIENT
Start: 2025-06-09

## 2025-06-06 RX ORDER — ATORVASTATIN CALCIUM 40 MG/1
40 TABLET, FILM COATED ORAL DAILY
COMMUNITY

## 2025-06-06 RX ORDER — PROGESTERONE 100 MG/1
100 CAPSULE ORAL NIGHTLY
Qty: 30 CAPSULE | Refills: 3 | Status: SHIPPED | OUTPATIENT
Start: 2025-06-06

## 2025-06-06 ASSESSMENT — ENCOUNTER SYMPTOMS
ALLERGIC/IMMUNOLOGIC NEGATIVE: 1
DIARRHEA: 0
CONSTIPATION: 0
EYES NEGATIVE: 1
RESPIRATORY NEGATIVE: 1
GASTROINTESTINAL NEGATIVE: 1

## 2025-06-06 NOTE — PATIENT INSTRUCTIONS
Patient Education        Breast Self-Exam: Care Instructions  Overview    A breast self-exam means regularly checking your breasts for lumps or changes. Depending on your health history, your doctor may tell you to do breast self-exams. These help you learn how your breasts normally look and feel. Most breast problems or changes are not because of cancer.  Breast self-exams don't take the place of a mammogram. Having regular breast exams by your doctor and mammograms can improve your chances of finding any problems with your breasts.  If you notice a change in your breast, tell your doctor. This may include any new lump, nipple discharge, or redness or a change in the skin's usual color.  Follow-up care is a key part of your treatment and safety. Be sure to make and go to all appointments, and call your doctor if you are having problems. It's also a good idea to know your test results and keep a list of the medicines you take.  How do you do a breast self-exam?  Try to set a time each month to do a step-by-step breast self-exam. If you have a menstrual period, the best time to check your breasts is usually 1 week after your period begins. Your breasts should not be tender then. If you don't have periods, you might do your exam on a day of the month that is easy to remember.  To check your breasts:  Remove all your clothes above the waist and lie down. When you are lying down, your breast tissue spreads evenly over your chest wall, which makes it easier to feel all your breast tissue.  Use the pads--not the fingertips--of the 3 middle fingers of your left hand to check your right breast. Move your fingers slowly in small coin-sized circles that overlap.  Use three levels of pressure to feel all of your breast tissue. Use light pressure to feel the tissue close to the skin surface. Use medium pressure to feel a little deeper. Use firm pressure to feel your tissue close to your breastbone and ribs. Use each pressure

## 2025-06-06 NOTE — PROGRESS NOTES
Pt presents today for pap smear and breast exam.  She states her last period was in Nov.     Mammo:2024-[facility]   Pap smear:  Contraception:Perimenopausal     P:2  Ab:2  Bone density:NA  Colonoscopy:

## 2025-06-06 NOTE — PROGRESS NOTES
Felisa Almanza is a 54 y.o. female who presents today for her medical conditions/ complaints as noted below. Felisa Almanza is c/o of Annual Exam        HPI  Pt presents for well woman exam and pap smear. Needs order for screening mammogram. PCP draws labs and manages meds. Has been working with Dr Means's office for weight loss and had significant weight loss in , but has now gained most of it back. Has continued dieting and exercising. Possibly considering starting GLP1 at future upcoming appt. Having more hot flashes, night sweats and fatigue. Questions regarding treatment options. Last period was in November.     Mammo:2024-[facility]   Pap smear:  Contraception:Perimenopausal     P:2  Ab:2  Bone density:NA  Colonoscopy:  No LMP recorded. Patient is perimenopausal.      Past Medical History:   Diagnosis Date    Back disorder     bulging disk    Colitis     Connective tissue and disc stenosis of intervertebral foramina of lumbar region 2016    Hypertension     Localized swelling of both lower legs     Lumbar radiculopathy, right 2016     Past Surgical History:   Procedure Laterality Date    BREAST ENHANCEMENT SURGERY       SECTION      COLONOSCOPY      2020    DILATION AND CURETTAGE OF UTERUS      LUMBAR SPINE SURGERY Right 2016    RIGHT L5-S1 MICRODISCECTOMY  performed by Darshan Carrero MD at [facility] OR    MICRODISCECTOMY LUMBAR      TOTAL HIP ARTHROPLASTY Left     TOTAL HIP ARTHROPLASTY Right 10/24/2022    RIGHT TOTAL HIP REPLACEMENT performed by John Antunez MD at [facility] OR     Family History   Problem Relation Age of Onset    No Known Problems Mother     Heart Attack Father 64    High Blood Pressure Father     Heart Disease Father     Diabetes Father     Dementia Father     Crohn's Disease Sister     Breast Cancer Maternal Grandmother     Diabetes Paternal Grandmother      Social History     Tobacco Use    Smoking status: Never    Smokeless tobacco: Never

## 2025-06-12 ENCOUNTER — RESULTS FOLLOW-UP (OUTPATIENT)
Dept: OBGYN CLINIC | Age: 55
End: 2025-06-12

## 2025-06-26 ENCOUNTER — PROCEDURE VISIT (OUTPATIENT)
Dept: OBGYN CLINIC | Age: 55
End: 2025-06-26

## 2025-06-26 VITALS
HEART RATE: 58 BPM | DIASTOLIC BLOOD PRESSURE: 89 MMHG | WEIGHT: 186 LBS | SYSTOLIC BLOOD PRESSURE: 148 MMHG | BODY MASS INDEX: 26.69 KG/M2

## 2025-06-26 DIAGNOSIS — R87.610 ATYPICAL SQUAMOUS CELLS OF UNDETERMINED SIGNIFICANCE (ASCUS) ON PAPANICOLAOU SMEAR OF CERVIX: Primary | ICD-10-CM

## 2025-06-26 DIAGNOSIS — N88.2 CERVICAL STENOSIS (UTERINE CERVIX): ICD-10-CM

## 2025-06-26 RX ORDER — TIRZEPATIDE 2.5 MG/.5ML
INJECTION, SOLUTION SUBCUTANEOUS WEEKLY
COMMUNITY

## 2025-06-26 NOTE — PROGRESS NOTES
Felisa Almanza is a 55 y.o. who presents for colposcopy.    BP (!) 148/89 (BP Site: Left Upper Arm, Patient Position: Sitting, BP Cuff Size: Medium Adult)   Pulse 58   Wt 84.4 kg (186 lb)   BMI 26.69 kg/m²       Colposcopy Procedure Note    Indications: Pap smear 1 months ago showed: ASCUS with NEGATIVE high risk HPV. The prior pap showed ASCUS with NEGATIVE high risk HPV.  Prior cervical/vaginal disease: normal exam without visible pathology. Prior cervical treatment: no treatment.    Procedure Details   The risks and benefits of the procedure and Written informed consent obtained.    Speculum placed in vagina and excellent visualization of cervix achieved, cervix swabbed x 3 with acetic acid solution.    Findings:  Cervix: no visible lesions, no mosaicism, no punctation, and no abnormal vasculature; SCJ visualized 360 degrees without lesions, no biopsies taken, cervical stenosis noted, single tooth tenaculum applied to anteriror cervical lip, endocervical curettage performed, scant specimen obtained, and specimen labelled and sent to pathology.  Vaginal inspection: normal without visible lesions.  Vulvar colposcopy: vulvar colposcopy not performed.    Specimens: ECC    Complications: none.    Plan:  Specimens labelled and sent to Pathology.  Will base further treatment on Pathology findings.  Treatment options discussed with patient.  Post biopsy instructions given to patient.

## 2025-07-05 ENCOUNTER — RESULTS FOLLOW-UP (OUTPATIENT)
Dept: OBGYN CLINIC | Age: 55
End: 2025-07-05

## 2025-08-06 RX ORDER — PROGESTERONE 100 MG/1
100 CAPSULE ORAL NIGHTLY
Qty: 90 CAPSULE | Refills: 3 | Status: SHIPPED | OUTPATIENT
Start: 2025-08-06

## 2025-08-06 RX ORDER — ESTRADIOL 0.05 MG/D
1 PATCH, EXTENDED RELEASE TRANSDERMAL
Qty: 24 PATCH | Refills: 3 | Status: SHIPPED | OUTPATIENT
Start: 2025-08-07

## (undated) DEVICE — Device: Brand: DEFENDO AIR/WATER/SUCTION AND BIOPSY VALVE

## (undated) DEVICE — THE CHANNEL CLEANING BRUSH IS A NYLON FLEXI BRUSH ATTACHED TO A FLEXIBLE PLASTIC SHEATH DESIGNED TO SAFELY REMOVE DEBRIS FROM FLEXIBLE ENDOSCOPES.

## (undated) DEVICE — TBG SMPL FLTR LINE NASL 02/C02 A/ BX/100

## (undated) DEVICE — 6.3MM ROUND FAST CUTTING BUR

## (undated) DEVICE — GLOVE SURG SZ 85 CRM LTX FREE POLYISOPRENE POLYMER BEAD ANTI

## (undated) DEVICE — SENSR O2 OXIMAX FNGR A/ 18IN NONSTR

## (undated) DEVICE — GLOVE SURG SZ 8 L12IN FNGR THK79MIL GRN LTX FREE

## (undated) DEVICE — GLOVE SURG SZ 75 CRM LTX FREE POLYISOPRENE POLYMER BEAD ANTI

## (undated) DEVICE — YANKAUER,BULB TIP WITH VENT: Brand: ARGYLE

## (undated) DEVICE — ENDOGATOR AUXILIARY WATER JET CONNECTOR: Brand: ENDOGATOR

## (undated) DEVICE — NON-WOVEN ADHESIVE WOUND DRESSING: Brand: PRIMAPORE ADHESIVE DRESSING 30*10CM

## (undated) DEVICE — E-Z CLEAN, NON-STICK, PTFE COATED, ELECTROSURGICAL BLADE ELECTRODE, 6.5 INCH (16.5 CM): Brand: MEGADYNE

## (undated) DEVICE — CHLORAPREP 26ML ORANGE

## (undated) DEVICE — GLOVE SURG SZ 85 L12IN FNGR THK79MIL GRN LTX FREE

## (undated) DEVICE — SUTURE VCRL SZ 3-0 L27IN ABSRB UD L26MM SH 1/2 CIR J416H

## (undated) DEVICE — TOTAL TRAY, 16FR 10ML SIL FOLEY, URN: Brand: MEDLINE

## (undated) DEVICE — ROYAL SILK SURGICAL GOWN, XXL: Brand: CONVERTORS

## (undated) DEVICE — TRAY EPI 25GA L3.5IN 0.75% BIPIVCAIN 8.25% D CONTAIN BPA

## (undated) DEVICE — PIN FIX L229MM DIA4.8MM S STL STYL 6 DMND 1 END THRD STNMN
Type: IMPLANTABLE DEVICE | Site: HIP | Status: NON-FUNCTIONAL
Removed: 2022-10-24

## (undated) DEVICE — FAN SPRAY KIT: Brand: PULSAVAC®

## (undated) DEVICE — SUTURE ABSRB BRAID COAT UD CP NO 2 27IN VCRL J195H

## (undated) DEVICE — FRCP BX RADJAW4 NDL 2.8 240 STD OG

## (undated) DEVICE — MSK O2 MD CONCENTR A/ LF 7FT 1P/U

## (undated) DEVICE — PACK ANT HIP CDS

## (undated) DEVICE — DUAL CUT SAGITTAL BLADE

## (undated) DEVICE — BIPOLAR SEALER 23-112-1 AQM 6.0: Brand: AQUAMANTYS ®

## (undated) DEVICE — UNDERGLOVE SURG SZ 8 FNGR THK0.21MIL GRN LTX BEAD CUF

## (undated) DEVICE — LIGHT SUCT UNTETHERED SCINTILLANT

## (undated) DEVICE — COVER POS PERINL POST NS 082501

## (undated) DEVICE — MASK,OXYGEN,MED CONC,ADLT,7' TUB, UC: Brand: PENDING

## (undated) DEVICE — SYSTEM SKIN CLSR 22CM DERMBND PRINEO

## (undated) DEVICE — SUTURE VCRL SZ 0 L27IN ABSRB UD L36MM CT-1 1/2 CIR J260H

## (undated) DEVICE — SUTURE VCRL SZ 2-0 L36IN ABSRB UD L36MM CT-1 1/2 CIR J945H